# Patient Record
Sex: FEMALE | Race: WHITE | NOT HISPANIC OR LATINO | Employment: OTHER | URBAN - METROPOLITAN AREA
[De-identification: names, ages, dates, MRNs, and addresses within clinical notes are randomized per-mention and may not be internally consistent; named-entity substitution may affect disease eponyms.]

---

## 2020-09-26 ENCOUNTER — HOSPITAL ENCOUNTER (OUTPATIENT)
Dept: RADIOLOGY | Facility: MEDICAL CENTER | Age: 65
End: 2020-09-26
Payer: MEDICARE

## 2020-09-26 ENCOUNTER — APPOINTMENT (OUTPATIENT)
Dept: RADIOLOGY | Facility: MEDICAL CENTER | Age: 65
DRG: 083 | End: 2020-09-26
Payer: MEDICARE

## 2020-09-26 ENCOUNTER — APPOINTMENT (OUTPATIENT)
Dept: RADIOLOGY | Facility: MEDICAL CENTER | Age: 65
DRG: 083 | End: 2020-09-26
Attending: NEUROLOGICAL SURGERY
Payer: MEDICARE

## 2020-09-26 ENCOUNTER — HOSPITAL ENCOUNTER (INPATIENT)
Facility: MEDICAL CENTER | Age: 65
LOS: 3 days | DRG: 083 | End: 2020-09-29
Attending: EMERGENCY MEDICINE | Admitting: SURGERY
Payer: MEDICARE

## 2020-09-26 DIAGNOSIS — I60.9 SUBARACHNOID HEMORRHAGE (HCC): ICD-10-CM

## 2020-09-26 DIAGNOSIS — S06.5XAA SUBDURAL HEMATOMA (HCC): ICD-10-CM

## 2020-09-26 DIAGNOSIS — S02.19XA: ICD-10-CM

## 2020-09-26 PROBLEM — Z53.09 CONTRAINDICATION TO DEEP VEIN THROMBOSIS (DVT) PROPHYLAXIS: Status: ACTIVE | Noted: 2020-09-26

## 2020-09-26 PROBLEM — S02.91XA CLOSED FRACTURE OF SKULL (HCC): Status: ACTIVE | Noted: 2020-09-26

## 2020-09-26 PROBLEM — S01.01XA SCALP LACERATION, INITIAL ENCOUNTER: Status: ACTIVE | Noted: 2020-09-26

## 2020-09-26 PROBLEM — I10 HYPERTENSION: Status: ACTIVE | Noted: 2020-09-26

## 2020-09-26 PROBLEM — Z79.82 ASPIRIN LONG-TERM USE: Status: ACTIVE | Noted: 2020-09-26

## 2020-09-26 PROBLEM — N28.9 RENAL INSUFFICIENCY: Status: ACTIVE | Noted: 2020-09-26

## 2020-09-26 PROBLEM — T14.90XA TRAUMA: Status: ACTIVE | Noted: 2020-09-26

## 2020-09-26 PROBLEM — Z11.9 SCREENING EXAMINATION FOR INFECTIOUS DISEASE: Status: ACTIVE | Noted: 2020-09-26

## 2020-09-26 LAB
ABO GROUP BLD: NORMAL
ALBUMIN SERPL BCP-MCNC: 4.1 G/DL (ref 3.2–4.9)
ALBUMIN/GLOB SERPL: 1.6 G/DL
ALP SERPL-CCNC: 116 U/L (ref 30–99)
ALT SERPL-CCNC: 22 U/L (ref 2–50)
ANION GAP SERPL CALC-SCNC: 14 MMOL/L (ref 7–16)
APTT PPP: 65.2 SEC (ref 24.7–36)
AST SERPL-CCNC: 25 U/L (ref 12–45)
BILIRUB SERPL-MCNC: 0.3 MG/DL (ref 0.1–1.5)
BLD GP AB SCN SERPL QL: NORMAL
BUN SERPL-MCNC: 25 MG/DL (ref 8–22)
CALCIUM SERPL-MCNC: 8.9 MG/DL (ref 8.5–10.5)
CFT BLD TEG: 8.2 MIN (ref 5–10)
CHLORIDE SERPL-SCNC: 103 MMOL/L (ref 96–112)
CLOT ANGLE BLD TEG: 59.4 DEGREES (ref 53–72)
CLOT LYSIS 30M P MA LENFR BLD TEG: 0 % (ref 0–8)
CO2 SERPL-SCNC: 21 MMOL/L (ref 20–33)
COVID ORDER STATUS COVID19: NORMAL
CREAT SERPL-MCNC: 1.19 MG/DL (ref 0.5–1.4)
CT.EXTRINSIC BLD ROTEM: 2.2 MIN (ref 1–3)
ERYTHROCYTE [DISTWIDTH] IN BLOOD BY AUTOMATED COUNT: 45.2 FL (ref 35.9–50)
ETHANOL BLD-MCNC: <10.1 MG/DL (ref 0–10.1)
GLOBULIN SER CALC-MCNC: 2.6 G/DL (ref 1.9–3.5)
GLUCOSE SERPL-MCNC: 142 MG/DL (ref 65–99)
HCG SERPL QL: NEGATIVE
HCT VFR BLD AUTO: 41.4 % (ref 37–47)
HGB BLD-MCNC: 13.8 G/DL (ref 12–16)
INR PPP: 1.11 (ref 0.87–1.13)
MCF BLD TEG: 70.2 MM (ref 50–70)
MCH RBC QN AUTO: 31.7 PG (ref 27–33)
MCHC RBC AUTO-ENTMCNC: 33.3 G/DL (ref 33.6–35)
MCV RBC AUTO: 95.2 FL (ref 81.4–97.8)
PA AA BLD-ACNC: 87.3 %
PA ADP BLD-ACNC: 32.9 %
PLATELET # BLD AUTO: 280 K/UL (ref 164–446)
PMV BLD AUTO: 9.1 FL (ref 9–12.9)
POTASSIUM SERPL-SCNC: 3.9 MMOL/L (ref 3.6–5.5)
PROT SERPL-MCNC: 6.7 G/DL (ref 6–8.2)
PROTHROMBIN TIME: 14.6 SEC (ref 12–14.6)
RBC # BLD AUTO: 4.35 M/UL (ref 4.2–5.4)
RH BLD: NORMAL
SARS-COV+SARS-COV-2 AG RESP QL IA.RAPID: NOTDETECTED
SODIUM SERPL-SCNC: 138 MMOL/L (ref 135–145)
SPECIMEN SOURCE: NORMAL
TEG ALGORITHM TGALG: ABNORMAL
WBC # BLD AUTO: 14.5 K/UL (ref 4.8–10.8)

## 2020-09-26 PROCEDURE — 84703 CHORIONIC GONADOTROPIN ASSAY: CPT

## 2020-09-26 PROCEDURE — 770022 HCHG ROOM/CARE - ICU (200)

## 2020-09-26 PROCEDURE — 99285 EMERGENCY DEPT VISIT HI MDM: CPT

## 2020-09-26 PROCEDURE — 86900 BLOOD TYPING SEROLOGIC ABO: CPT

## 2020-09-26 PROCEDURE — 85576 BLOOD PLATELET AGGREGATION: CPT

## 2020-09-26 PROCEDURE — 85610 PROTHROMBIN TIME: CPT | Mod: 91

## 2020-09-26 PROCEDURE — 85027 COMPLETE CBC AUTOMATED: CPT

## 2020-09-26 PROCEDURE — 86850 RBC ANTIBODY SCREEN: CPT

## 2020-09-26 PROCEDURE — 86901 BLOOD TYPING SEROLOGIC RH(D): CPT

## 2020-09-26 PROCEDURE — 85730 THROMBOPLASTIN TIME PARTIAL: CPT | Mod: 91

## 2020-09-26 PROCEDURE — 700111 HCHG RX REV CODE 636 W/ 250 OVERRIDE (IP): Performed by: SURGERY

## 2020-09-26 PROCEDURE — 85347 COAGULATION TIME ACTIVATED: CPT

## 2020-09-26 PROCEDURE — 87426 SARSCOV CORONAVIRUS AG IA: CPT

## 2020-09-26 PROCEDURE — 305950 HCHG YELLOW TRAUMA ACT PRE-NOTIFY NO CC

## 2020-09-26 PROCEDURE — 80053 COMPREHEN METABOLIC PANEL: CPT | Mod: 91

## 2020-09-26 PROCEDURE — C9803 HOPD COVID-19 SPEC COLLECT: HCPCS | Performed by: SURGERY

## 2020-09-26 PROCEDURE — 700105 HCHG RX REV CODE 258: Performed by: SURGERY

## 2020-09-26 PROCEDURE — 85384 FIBRINOGEN ACTIVITY: CPT

## 2020-09-26 RX ORDER — ENEMA 19; 7 G/133ML; G/133ML
1 ENEMA RECTAL
Status: DISCONTINUED | OUTPATIENT
Start: 2020-09-26 | End: 2020-09-29 | Stop reason: HOSPADM

## 2020-09-26 RX ORDER — LEVETIRACETAM 5 MG/ML
500 INJECTION INTRAVASCULAR 2 TIMES DAILY
Status: DISCONTINUED | OUTPATIENT
Start: 2020-09-26 | End: 2020-09-27

## 2020-09-26 RX ORDER — POLYETHYLENE GLYCOL 3350 17 G/17G
1 POWDER, FOR SOLUTION ORAL 2 TIMES DAILY
Status: DISCONTINUED | OUTPATIENT
Start: 2020-09-26 | End: 2020-09-29 | Stop reason: HOSPADM

## 2020-09-26 RX ORDER — SODIUM CHLORIDE 9 MG/ML
INJECTION, SOLUTION INTRAVENOUS CONTINUOUS
Status: DISCONTINUED | OUTPATIENT
Start: 2020-09-26 | End: 2020-09-27

## 2020-09-26 RX ORDER — BUPROPION HYDROCHLORIDE 100 MG/1
200 TABLET ORAL 2 TIMES DAILY
COMMUNITY

## 2020-09-26 RX ORDER — MORPHINE SULFATE 4 MG/ML
4 INJECTION, SOLUTION INTRAMUSCULAR; INTRAVENOUS
Status: DISCONTINUED | OUTPATIENT
Start: 2020-09-26 | End: 2020-09-27

## 2020-09-26 RX ORDER — FAMOTIDINE 20 MG/1
20 TABLET, FILM COATED ORAL 2 TIMES DAILY
Status: DISCONTINUED | OUTPATIENT
Start: 2020-09-26 | End: 2020-09-27

## 2020-09-26 RX ORDER — AMOXICILLIN 250 MG
1 CAPSULE ORAL
Status: DISCONTINUED | OUTPATIENT
Start: 2020-09-26 | End: 2020-09-29 | Stop reason: HOSPADM

## 2020-09-26 RX ORDER — DOCUSATE SODIUM 100 MG/1
100 CAPSULE, LIQUID FILLED ORAL 2 TIMES DAILY
Status: DISCONTINUED | OUTPATIENT
Start: 2020-09-26 | End: 2020-09-29 | Stop reason: HOSPADM

## 2020-09-26 RX ORDER — BISACODYL 10 MG
10 SUPPOSITORY, RECTAL RECTAL
Status: DISCONTINUED | OUTPATIENT
Start: 2020-09-26 | End: 2020-09-29 | Stop reason: HOSPADM

## 2020-09-26 RX ORDER — AMOXICILLIN 250 MG
1 CAPSULE ORAL NIGHTLY
Status: DISCONTINUED | OUTPATIENT
Start: 2020-09-26 | End: 2020-09-29 | Stop reason: HOSPADM

## 2020-09-26 RX ORDER — ONDANSETRON 2 MG/ML
4 INJECTION INTRAMUSCULAR; INTRAVENOUS EVERY 4 HOURS PRN
Status: DISCONTINUED | OUTPATIENT
Start: 2020-09-26 | End: 2020-09-28

## 2020-09-26 RX ADMIN — MORPHINE SULFATE 4 MG: 4 INJECTION INTRAVENOUS at 23:17

## 2020-09-26 RX ADMIN — FAMOTIDINE 20 MG: 10 INJECTION INTRAVENOUS at 23:17

## 2020-09-26 RX ADMIN — SODIUM CHLORIDE: 9 INJECTION, SOLUTION INTRAVENOUS at 22:48

## 2020-09-26 RX ADMIN — LEVETIRACETAM INJECTION 500 MG: 5 INJECTION INTRAVENOUS at 23:03

## 2020-09-26 ASSESSMENT — COGNITIVE AND FUNCTIONAL STATUS - GENERAL
SUGGESTED CMS G CODE MODIFIER MOBILITY: CH
SUGGESTED CMS G CODE MODIFIER DAILY ACTIVITY: CH
DAILY ACTIVITIY SCORE: 24
MOBILITY SCORE: 24

## 2020-09-26 ASSESSMENT — FIBROSIS 4 INDEX
FIB4 SCORE: 1.53
FIB4 SCORE: 1.24
FIB4 SCORE: 1.3

## 2020-09-26 ASSESSMENT — PATIENT HEALTH QUESTIONNAIRE - PHQ9
1. LITTLE INTEREST OR PLEASURE IN DOING THINGS: NOT AT ALL
2. FEELING DOWN, DEPRESSED, IRRITABLE, OR HOPELESS: NOT AT ALL
SUM OF ALL RESPONSES TO PHQ9 QUESTIONS 1 AND 2: 0

## 2020-09-26 ASSESSMENT — LIFESTYLE VARIABLES
TOTAL SCORE: 0
CONSUMPTION TOTAL: NEGATIVE
HAVE YOU EVER FELT YOU SHOULD CUT DOWN ON YOUR DRINKING: NO
TOTAL SCORE: 0
TOTAL SCORE: 0
ALCOHOL_USE: NO
HOW MANY TIMES IN THE PAST YEAR HAVE YOU HAD 5 OR MORE DRINKS IN A DAY: 0
AVERAGE NUMBER OF DAYS PER WEEK YOU HAVE A DRINK CONTAINING ALCOHOL: 0
EVER FELT BAD OR GUILTY ABOUT YOUR DRINKING: NO
ON A TYPICAL DAY WHEN YOU DRINK ALCOHOL HOW MANY DRINKS DO YOU HAVE: 0
HAVE PEOPLE ANNOYED YOU BY CRITICIZING YOUR DRINKING: NO
EVER HAD A DRINK FIRST THING IN THE MORNING TO STEADY YOUR NERVES TO GET RID OF A HANGOVER: NO

## 2020-09-26 ASSESSMENT — PAIN DESCRIPTION - PAIN TYPE: TYPE: ACUTE PAIN

## 2020-09-27 ENCOUNTER — APPOINTMENT (OUTPATIENT)
Dept: RADIOLOGY | Facility: MEDICAL CENTER | Age: 65
DRG: 083 | End: 2020-09-27
Attending: SURGERY
Payer: MEDICARE

## 2020-09-27 ENCOUNTER — APPOINTMENT (OUTPATIENT)
Dept: RADIOLOGY | Facility: MEDICAL CENTER | Age: 65
DRG: 083 | End: 2020-09-27
Attending: NURSE PRACTITIONER
Payer: MEDICARE

## 2020-09-27 LAB
ABO + RH BLD: NORMAL
ALBUMIN SERPL BCP-MCNC: 3.7 G/DL (ref 3.2–4.9)
ALBUMIN/GLOB SERPL: 1.6 G/DL
ALP SERPL-CCNC: 100 U/L (ref 30–99)
ALT SERPL-CCNC: 19 U/L (ref 2–50)
ANION GAP SERPL CALC-SCNC: 12 MMOL/L (ref 7–16)
AST SERPL-CCNC: 17 U/L (ref 12–45)
BARCODED ABORH UBTYP: 6200
BARCODED PRD CODE UBPRD: NORMAL
BARCODED UNIT NUM UBUNT: NORMAL
BASOPHILS # BLD AUTO: 0.4 % (ref 0–1.8)
BASOPHILS # BLD: 0.05 K/UL (ref 0–0.12)
BILIRUB SERPL-MCNC: 0.4 MG/DL (ref 0.1–1.5)
BUN SERPL-MCNC: 20 MG/DL (ref 8–22)
CALCIUM SERPL-MCNC: 8.4 MG/DL (ref 8.5–10.5)
CFT BLD TEG: 10.2 MIN (ref 5–10)
CHLORIDE SERPL-SCNC: 106 MMOL/L (ref 96–112)
CLOT ANGLE BLD TEG: 61.8 DEGREES (ref 53–72)
CLOT LYSIS 30M P MA LENFR BLD TEG: 0 % (ref 0–8)
CO2 SERPL-SCNC: 21 MMOL/L (ref 20–33)
COMPONENT P 8504P: NORMAL
CREAT SERPL-MCNC: 0.84 MG/DL (ref 0.5–1.4)
CT.EXTRINSIC BLD ROTEM: 1.8 MIN (ref 1–3)
EOSINOPHIL # BLD AUTO: 0.02 K/UL (ref 0–0.51)
EOSINOPHIL NFR BLD: 0.2 % (ref 0–6.9)
ERYTHROCYTE [DISTWIDTH] IN BLOOD BY AUTOMATED COUNT: 45.6 FL (ref 35.9–50)
GLOBULIN SER CALC-MCNC: 2.3 G/DL (ref 1.9–3.5)
GLUCOSE SERPL-MCNC: 138 MG/DL (ref 65–99)
HCT VFR BLD AUTO: 37.9 % (ref 37–47)
HGB BLD-MCNC: 12.5 G/DL (ref 12–16)
IMM GRANULOCYTES # BLD AUTO: 0.04 K/UL (ref 0–0.11)
IMM GRANULOCYTES NFR BLD AUTO: 0.4 % (ref 0–0.9)
LYMPHOCYTES # BLD AUTO: 0.92 K/UL (ref 1–4.8)
LYMPHOCYTES NFR BLD: 8.1 % (ref 22–41)
MCF BLD TEG: 69.6 MM (ref 50–70)
MCH RBC QN AUTO: 31.6 PG (ref 27–33)
MCHC RBC AUTO-ENTMCNC: 33 G/DL (ref 33.6–35)
MCV RBC AUTO: 95.7 FL (ref 81.4–97.8)
MONOCYTES # BLD AUTO: 0.62 K/UL (ref 0–0.85)
MONOCYTES NFR BLD AUTO: 5.5 % (ref 0–13.4)
NEUTROPHILS # BLD AUTO: 9.68 K/UL (ref 2–7.15)
NEUTROPHILS NFR BLD: 85.4 % (ref 44–72)
NRBC # BLD AUTO: 0 K/UL
NRBC BLD-RTO: 0 /100 WBC
PA AA BLD-ACNC: 0 %
PA ADP BLD-ACNC: 1.7 %
PLATELET # BLD AUTO: 254 K/UL (ref 164–446)
PMV BLD AUTO: 9.2 FL (ref 9–12.9)
POTASSIUM SERPL-SCNC: 3.9 MMOL/L (ref 3.6–5.5)
PRODUCT TYPE UPROD: NORMAL
PROT SERPL-MCNC: 6 G/DL (ref 6–8.2)
RBC # BLD AUTO: 3.96 M/UL (ref 4.2–5.4)
SODIUM SERPL-SCNC: 139 MMOL/L (ref 135–145)
TEG ALGORITHM TGALG: ABNORMAL
UNIT STATUS USTAT: NORMAL
WBC # BLD AUTO: 11.3 K/UL (ref 4.8–10.8)

## 2020-09-27 PROCEDURE — 36430 TRANSFUSION BLD/BLD COMPNT: CPT

## 2020-09-27 PROCEDURE — 770022 HCHG ROOM/CARE - ICU (200)

## 2020-09-27 PROCEDURE — 6A550Z2 PHERESIS OF PLATELETS, SINGLE: ICD-10-PCS | Performed by: SURGERY

## 2020-09-27 PROCEDURE — 80053 COMPREHEN METABOLIC PANEL: CPT

## 2020-09-27 PROCEDURE — 93970 EXTREMITY STUDY: CPT | Mod: 26 | Performed by: INTERNAL MEDICINE

## 2020-09-27 PROCEDURE — A9270 NON-COVERED ITEM OR SERVICE: HCPCS | Performed by: SURGERY

## 2020-09-27 PROCEDURE — P9034 PLATELETS, PHERESIS: HCPCS

## 2020-09-27 PROCEDURE — 93970 EXTREMITY STUDY: CPT

## 2020-09-27 PROCEDURE — 700102 HCHG RX REV CODE 250 W/ 637 OVERRIDE(OP): Performed by: NURSE PRACTITIONER

## 2020-09-27 PROCEDURE — 70450 CT HEAD/BRAIN W/O DYE: CPT

## 2020-09-27 PROCEDURE — 85025 COMPLETE CBC W/AUTO DIFF WBC: CPT

## 2020-09-27 PROCEDURE — 85384 FIBRINOGEN ACTIVITY: CPT

## 2020-09-27 PROCEDURE — 700111 HCHG RX REV CODE 636 W/ 250 OVERRIDE (IP): Performed by: SURGERY

## 2020-09-27 PROCEDURE — 700102 HCHG RX REV CODE 250 W/ 637 OVERRIDE(OP): Performed by: SURGERY

## 2020-09-27 PROCEDURE — A9270 NON-COVERED ITEM OR SERVICE: HCPCS | Performed by: NURSE PRACTITIONER

## 2020-09-27 PROCEDURE — 85576 BLOOD PLATELET AGGREGATION: CPT | Mod: 91

## 2020-09-27 PROCEDURE — 85347 COAGULATION TIME ACTIVATED: CPT

## 2020-09-27 PROCEDURE — 99233 SBSQ HOSP IP/OBS HIGH 50: CPT | Performed by: SURGERY

## 2020-09-27 RX ORDER — LEVETIRACETAM 500 MG/1
500 TABLET ORAL 2 TIMES DAILY
Status: DISCONTINUED | OUTPATIENT
Start: 2020-09-27 | End: 2020-09-29 | Stop reason: HOSPADM

## 2020-09-27 RX ORDER — LEVETIRACETAM 5 MG/ML
500 INJECTION INTRAVASCULAR 2 TIMES DAILY
Status: DISCONTINUED | OUTPATIENT
Start: 2020-09-27 | End: 2020-09-27

## 2020-09-27 RX ORDER — ACETAMINOPHEN 325 MG/1
650 TABLET ORAL EVERY 4 HOURS
Status: DISCONTINUED | OUTPATIENT
Start: 2020-09-27 | End: 2020-09-29 | Stop reason: HOSPADM

## 2020-09-27 RX ORDER — OXYCODONE HYDROCHLORIDE 5 MG/1
5 TABLET ORAL EVERY 4 HOURS PRN
Status: DISCONTINUED | OUTPATIENT
Start: 2020-09-27 | End: 2020-09-27

## 2020-09-27 RX ORDER — MORPHINE SULFATE 4 MG/ML
2 INJECTION, SOLUTION INTRAMUSCULAR; INTRAVENOUS
Status: DISCONTINUED | OUTPATIENT
Start: 2020-09-27 | End: 2020-09-28

## 2020-09-27 RX ORDER — OXYCODONE HYDROCHLORIDE 5 MG/1
5-10 TABLET ORAL EVERY 4 HOURS PRN
Status: DISCONTINUED | OUTPATIENT
Start: 2020-09-27 | End: 2020-09-29 | Stop reason: HOSPADM

## 2020-09-27 RX ADMIN — MORPHINE SULFATE 4 MG: 4 INJECTION INTRAVENOUS at 09:42

## 2020-09-27 RX ADMIN — ACETAMINOPHEN 650 MG: 325 TABLET, FILM COATED ORAL at 15:08

## 2020-09-27 RX ADMIN — ACETAMINOPHEN 650 MG: 325 TABLET, FILM COATED ORAL at 18:15

## 2020-09-27 RX ADMIN — LEVETIRACETAM 500 MG: 500 TABLET ORAL at 18:15

## 2020-09-27 RX ADMIN — DOCUSATE SODIUM 100 MG: 100 CAPSULE, LIQUID FILLED ORAL at 18:15

## 2020-09-27 RX ADMIN — LEVETIRACETAM INJECTION 500 MG: 5 INJECTION INTRAVENOUS at 08:19

## 2020-09-27 RX ADMIN — MORPHINE SULFATE 4 MG: 4 INJECTION INTRAVENOUS at 03:00

## 2020-09-27 ASSESSMENT — COPD QUESTIONNAIRES
DURING THE PAST 4 WEEKS HOW MUCH DID YOU FEEL SHORT OF BREATH: NONE/LITTLE OF THE TIME
COPD SCREENING SCORE: 2
DO YOU EVER COUGH UP ANY MUCUS OR PHLEGM?: YES, A FEW DAYS A WEEK OR MONTH
HAVE YOU SMOKED AT LEAST 100 CIGARETTES IN YOUR ENTIRE LIFE: NO/DON'T KNOW

## 2020-09-27 ASSESSMENT — LIFESTYLE VARIABLES: EVER_SMOKED: NEVER

## 2020-09-27 ASSESSMENT — ENCOUNTER SYMPTOMS
RESPIRATORY NEGATIVE: 1
PSYCHIATRIC NEGATIVE: 1
MYALGIAS: 1
CARDIOVASCULAR NEGATIVE: 1
HEADACHES: 1

## 2020-09-27 ASSESSMENT — PAIN DESCRIPTION - PAIN TYPE
TYPE: ACUTE PAIN
TYPE: ACUTE PAIN

## 2020-09-27 NOTE — ASSESSMENT & PLAN NOTE
Fell backwards on stairs. (-) LOC.  Evaluated at [facility]   Trauma Yellow Transfer Activation.  Guera Smith MD. Trauma Surgery.

## 2020-09-27 NOTE — ASSESSMENT & PLAN NOTE
Initial systemic anticoagulation contraindicated secondary to elevated bleeding risk.   9/27 Surveillance venous duplex negative.  9/28 Heparin initiated.

## 2020-09-27 NOTE — ED PROVIDER NOTES
ED Provider Note    This patient was cared for during the COVID-19 pandemic. History and physical exam may be limited/truncated by the inherent challenges of PPE and the need to decrease staff exposure to novel coronavirus. Some aspects of disease management may be different to protect staff and help slow the spread of disease. I verified that, if possible, the patient was wearing a mask and I was wearing appropriate PPE every time I encountered the patient.       CHIEF COMPLAINT  No chief complaint on file.      BRYAN Ford is a 65 y.o. female who presents as a transfer from [facility].  Patient tripped down 3 stairs and hit her head against a cement block.  She does not remember the fall she has had a mild headache and was nauseous.  She takes aspirin but no other anticoagulation.  She does not have a history of loss of consciousness.  Patient was visiting from [place].        REVIEW OF SYSTEMS  positive for headache nausea, negative for fevers vomiting. All other systems are negative.     PAST MEDICAL HISTORY   has a past medical history of Hypertension, Psychiatric disorder, and Renal disorder.    SOCIAL HISTORY  Social History     Tobacco Use   • Smoking status: Never Smoker   • Smokeless tobacco: Never Used   Substance and Sexual Activity   • Alcohol use: Never     Frequency: Never   • Drug use: Never   • Sexual activity: Not on file       SURGICAL HISTORY   has a past surgical history that includes ankle fusion (Left) and hysterectomy, total abdominal.    CURRENT MEDICATIONS  Home Medications     Reviewed by Eleanor Banks R.N. (Registered Nurse) on 09/26/20 at 9573  Med List Status: Partial   Medication Last Dose Status   AMLODIPINE BESYLATE PO  Active   aspirin (ASA) 81 MG Chew Tab chewable tablet  Active   atorvastatin (LIPITOR) 10 MG Tab  Active   buPROPion (WELLBUTRIN) 100 MG Tab  Active   DULOXETINE HCL PO  Active   furosemide (LASIX) 20 MG Tab  Active   METOPROLOL SUCCINATE PO  Active  "  SPIRONOLACTONE PO  Active                ALLERGIES  Allergies   Allergen Reactions   • Lisinopril Swelling     Lips swelling       PHYSICAL EXAM  VITAL SIGNS: BP (!) 161/92   Pulse 97   Temp 36.6 °C (97.9 °F) (Temporal)   Resp (!) 21   Ht 1.651 m (5' 5\")   Wt 111 kg (244 lb 11.4 oz)   SpO2 100%   BMI 40.72 kg/m² .  Constitutional: Alert in no apparent distress.  HENT: No signs of trauma, Bilateral external ears normal, Nose normal.   Eyes: Pupils are equal and reactive, Conjunctiva normal, Non-icteric.   Neck: Normal range of motion, No tenderness, Supple, No stridor.   Cardiovascular: Regular rate and rhythm, no murmurs.   Thorax & Lungs: Normal breath sounds, No respiratory distress, No wheezing, No chest tenderness.   Abdomen: Bowel sounds normal, Soft, No tenderness, No masses, No peritoneal signs.  Skin: Warm, Dry, No erythema, No rash.   Musculoskeletal:  no major deformities noted.   Neurologic: Alert,  No focal deficits noted.   Psychiatric: Affect normal, Judgment normal, Mood normal.       DIAGNOSTIC STUDIES / PROCEDURES          LABS  Labs Reviewed   CBC WITHOUT DIFFERENTIAL - Abnormal; Notable for the following components:       Result Value    WBC 14.5 (*)     MCHC 33.3 (*)     All other components within normal limits   COMP METABOLIC PANEL - Abnormal; Notable for the following components:    Glucose 142 (*)     Bun 25 (*)     Alkaline Phosphatase 116 (*)     All other components within normal limits   ESTIMATED GFR - Abnormal; Notable for the following components:    GFR If  55 (*)     GFR If Non  45 (*)     All other components within normal limits   COVID/SARS COV-2    Narrative:     Is patient being admitted?->Yes  Does this patient meet criteria for Rush/Cepheid per [facility]  Inpatient Workflow? (See workflow link below)->Yes  Expected turn around time?->Rush (Cepheid 2-4 hours)  Is this the patients First SARS CoV-2 test?->Unknown  Is this patient employed " in healthcare?->Unknown  Is the patient symptomatic as defined by the CDC?->Unknown  Is the patient hospitalized?->Yes  Is the patient in the ICU?->Yes  Is the patient a resident in a congregate care  setting?->Unknown  Is the patient pregnant?->Unknown   DIAGNOSTIC ALCOHOL   PROTHROMBIN TIME   APTT   HCG QUAL SERUM   COD (ADULT)   PLATELET MAPPING WITH BASIC TEG   SARS-COV ANTIGEN OSCAR    Narrative:     Is patient being admitted?->Yes  Does this patient meet criteria for Rush/Cepheid per [facility]  Inpatient Workflow? (See workflow link below)->Yes  Expected turn around time?->Rush (Cepheid 2-4 hours)  Is this the patients First SARS CoV-2 test?->Unknown  Is this patient employed in healthcare?->Unknown  Is the patient symptomatic as defined by the CDC?->Unknown  Is the patient hospitalized?->Yes  Is the patient in the ICU?->Yes  Is the patient a resident in a congregate care  setting?->Unknown  Is the patient pregnant?->Unknown   COMPONENT CELLULAR   ABO RH CONFIRM       RADIOLOGY  OUTSIDE IMAGES-CT CERVICAL SPINE   Final Result      OUTSIDE IMAGES-CT HEAD   Final Result      CT-HEAD W/O    (Results Pending)   CT-HEAD W/O    (Results Pending)   US-ABORTED US PROCEDURE    (Results Pending)       Medical records reviewed for continuity of care.  Records from [place] are reviewed.  CT scan shows a moderate subdural over the left occipital and parietal lobes.  There is additionally subarachnoid blood.  There is a nondisplaced fracture involving the right posterior fossa of the skull.        COURSE & MEDICAL DECISION MAKING  Pertinent Labs & Imaging studies reviewed. (See chart for details)  Patient was evaluated in the trauma bay by myself and Dr. Smith, trauma surgery.    Patient has images uploaded in the system that show moderate subdural and subarachnoid.  Dr. Smith spoke with Dr. Garcia who will consult.  Patient will be hospitalized with the trauma service in the ICU in critical condition.          FINAL  IMPRESSION  1. Subarachnoid hemorrhage (HCC)     2. Closed fracture of posterior cranial fossa, initial encounter (HCC)     3. Subdural hematoma (HCC)         2.   3.    This dictation has been creating using voice recognition software. The accuracy of the dictation is limited the abilities of the software.  I expect there may be some errors of grammar and possibly content. I made every attempt to manually correct the errors within my dictation. However errors related to this voice recognition software may still exist and should be interpreted within the appropriate context.      The note accurately reflects work and decisions made by me.  Irma Wallace M.D.  9/26/2020  11:01 PM

## 2020-09-27 NOTE — ASSESSMENT & PLAN NOTE
Nondisplaced fracture involving the right posterior fossa of the skull.  Non-operative management.  Ronald Garcia DO. Neurosurgeon. [facility].

## 2020-09-27 NOTE — ED NOTES
Fell backwards hit head on wall, takes aspirin at home, no complaints of pain,  5mg compazine, 50 mcgfentanyl and 4mg zofran given in route

## 2020-09-27 NOTE — ASSESSMENT & PLAN NOTE
Moderate subdural hematoma posterior overlying the left occipital and parietal lobes.  Subarachnoid blood within the brain sulcations at the anterior margins, and base of the frontal lobes.  Non-operative management.   Follow up head CT with new hemorrhage in left frontal region  Post traumatic pharmacologic seizure prophylaxis for 1 week.  Speech Language Pathology cognitive evaluation.  Ronald Garcia DO. Neurosurgeon. [facility].

## 2020-09-27 NOTE — ASSESSMENT & PLAN NOTE
Abnormal renal indices on admission  Cr 1.4 / BUN 24 / GFR 38  Gentle hydration  Repeat labs improved

## 2020-09-27 NOTE — CARE PLAN
Problem: Knowledge Deficit  Goal: Knowledge of disease process/condition, treatment plan, diagnostic tests, and medications will improve  Outcome: PROGRESSING AS EXPECTED  Intervention: Assess knowledge level of disease process/condition, treatment plan, diagnostic tests, and medications  Note: Understands plan of care and treatment      Problem: Pain Management  Goal: Pain level will decrease to patient's comfort goal  Outcome: PROGRESSING AS EXPECTED  Intervention: Follow pain managment plan developed in collaboration with patient and Interdisciplinary Team  Note: PRN morphine given for headache

## 2020-09-27 NOTE — DISCHARGE PLANNING
Trauma Response    Referral: Trauma Red Response    Intervention: SW responded to trauma red.  Pt was BIB [facility] after a fall .  Pt was alert upon arrival.  Pts name is Ling Ford (: 1955).  SW obtained the following pt information: SW spoke to the pt in the trauma bay and she advised SW that her sister is aware of her being brought to [facility].  The pt advised DONY that her emergency contact is Julieta (sister) [phone]    Plan: SW will remain available for pt support.

## 2020-09-27 NOTE — PROGRESS NOTES
Full note to follow.  CT head reviewed. Left small acute SDH and skull fracture. ICU admission for frequent neuro checks.   Does take 81 mg Aspirin.   Agree with repeat in am.  Keppra sz ppx  Elevate head of bed greater than 30 degrees.  Q1 neuro checks.  TEG pending. If inhibited then recommend giving platelets.   No operative interventions at this time.

## 2020-09-27 NOTE — PROGRESS NOTES
"Trauma Progress Note 9/27/2020 5:08 AM    This is a 65 y.o. female who had a ground level fall. She sustained subdural hematoma and subarachnoid hemorrhage. He neuro checks were stable through the night with GCS of 15. TEG with platelet mapping showed 87.2 % AA inhibition and the patient was transfused a unit of platelets.   Approximate resuscitation given to this point includes: 1 U platelets    Plan:   - continue neuro checks   - remain in ICU today  - may have a diet per neurosurgery  - repeat TEG at 9 AM    Assessment: awake, alert, GCS 15    /67   Pulse 93   Temp 36.6 °C (97.9 °F) (Temporal)   Resp 20   Ht 1.651 m (5' 5\")   Wt 111 kg (244 lb 11.4 oz)   SpO2 96%   BMI 40.72 kg/m²     Hemoglobin: 12.5 g/dL  Hematocrit: 37.9 %    Urine Output: voiding / adequate output    Recent Labs     09/26/20 2005 09/26/20  2214   APTT 45.3* 65.2*   INR 1.11 1.11      Recent Labs     09/26/20  2214   REACTMIN 8.2   CLOTKINET 2.2   CLOTANGL 59.4   MAXCLOTS 70.2*   [id number] 0.0   PRCINADP 32.9   PRCINAA 87.3     Subdural hematoma, post-traumatic (HCC)- (present on admission)  Assessment & Plan  Moderate subdural hematoma posterior overlying the left occipital and parietal lobes.  Subarachnoid blood within the brain sulcations at the anterior margins, and base of the frontal lobes.  Non-operative management.   Follow up head CT in AM  Post traumatic pharmacologic seizure prophylaxis for 1 week.  Speech Language Pathology cognitive evaluation.  Ronald Garcia DO. Neurosurgeon. [facility].    Hypertension- (present on admission)  Assessment & Plan  Chronic condition treated with amlodipine, metoprolol.  Definitive medication reconciliation pending.    Renal insufficiency- (present on admission)  Assessment & Plan  Abnormal renal indices on admission  Cr 1.4 / BUN 24 / GFR 38  Gentle hydration  Repeat labs in AM    Aspirin long-term use- (present on admission)  Assessment & Plan  Daily aspirin 81 mg  TEG " with 87.2 % AA inhibition   Transfused one unit of platelets.  Repeat TEG     Contraindication to deep vein thrombosis (DVT) prophylaxis- (present on admission)  Assessment & Plan  Initial systemic anticoagulation contraindicated secondary to elevated bleeding risk.   9/27 Surveillance venous duplex scanning ordered    Screening examination for infectious disease- (present on admission)  Assessment & Plan  Admission SARS-CoV-2 testing negative. LOW RISK patient. Repeat SARS-CoV-2 testing not indicated. Isolation precautions de-escalated.    Closed fracture of skull (HCC)- (present on admission)  Assessment & Plan  Nondisplaced fracture involving the right posterior fossa of the skull.  Non-operative management.  Ronald Garcia DO. Neurosurgeon. [facility].    Scalp laceration, initial encounter- (present on admission)  Assessment & Plan  Repaired at [facility]    Trauma- (present on admission)  Assessment & Plan  Fell backwards on stairs. (-) LOC.  Evaluated at [facility]   Trauma Yellow Transfer Activation.  Guera Smith MD. Trauma Surgery.

## 2020-09-27 NOTE — PROGRESS NOTES
"   TRAUMA TERTIARY SURVEY     Mental status adequate for full examination?: Yes    Spine cleared (radiologically and/or clinically): Yes    PHYSICAL EXAMINATION:  Vitals: /68   Pulse 93   Temp 36.3 °C (97.4 °F)   Resp 20   Ht 1.651 m (5' 5\")   Wt 111 kg (244 lb 11.4 oz)   SpO2 95%   BMI 40.72 kg/m²   Constitutional:     General Appearance: appears stated age.  HEENT:    Staples posterior head.. The pupils are equal, round, and reactive to light bilaterally. The extraocular muscles are intact bilaterally.. The nares and oropharynx are clear. The midface and jaw are stable. No malocclusion is evident.  Neck:    Normal range of motion.  Respiratory:   Inspection: Unlabored respirations, no intercostal retractions, paradoxical motion, or accessory muscle use.   Palpation:  The chest is nontender. The clavicles are non deformed bilaterally..   Auscultation: clear to auscultation.  Cardiovascular:   Auscultation: regular rate and rhythm.   Peripheral Pulses: Normal.   Abdomen:   Abdomen is soft, nontender, without organomegaly or masses.  Genitourinary:   (FEMALE): not observed.  Musculoskeletal:   The pelvis is stable.  No significant angulation, deformity, or soft tissue injury involving the upper and lower extremities. Normal range of motion.   Back:   The thoracolumbar spine was examined. Examination is remarkable for no significant tenderness, swelling, or deformity in the thoracolumbar region.  Skin:   The skin is warm and dry.  Neurologic:    [place] Coma Scale (GCS) 15 [id number]. Neurologic examination revealed no focal deficits noted.  Psychiatric:   The patient does not appear depressed or anxious.    IMAGING:  CT-HEAD W/O   Final Result         1. Grossly similar bilateral acute subdural hematoma along the right frontal, left parietal and occipital lobe with extension to the falx.      2. Grossly unchanged bilateral frontal subarachnoid hemorrhage.      3.New 1.0 cm left anterior frontal " intraparenchymal hemorrhage.            OUTSIDE IMAGES-CT CERVICAL SPINE   Final Result      OUTSIDE IMAGES-CT HEAD   Final Result      US-ABORTED US PROCEDURE    (Results Pending)   US-TRAUMA VEIN SCREEN LOWER BILAT EXTREMITY    (Results Pending)     All current laboratory studies/radiology exams reviewed: Yes    Completed Consultations:  Neurosurgery     Pending Consultations:  none    Newly Identified Diagnoses and Injuries:  none    TOTAL RAP SCORE:  RAP Score Total: 8      ETOH Screening  CAGE Score: 0  Assessment complete date: 9/27/2020

## 2020-09-27 NOTE — PROGRESS NOTES
Released unit of platelets, blood bank unable to complete request due to multiple traumas. Told to call back in 30 min

## 2020-09-27 NOTE — DIETARY
NUTRITION SERVICES: BMI - Pt with BMI >40 (=Body mass index is 40.72 kg/m².), morbid obesity. Weight loss counseling not appropriate in acute care setting. RECOMMEND - Referral to outpatient nutrition services for weight management after D/C. RD available PRN.

## 2020-09-27 NOTE — CONSULTS
Neurosurgery Consult Note    Patient: Ling Ford    MRN: [id number]    Date of Consultation: 9/27/2020    Reason for Consultation: S/P fall with SDH    Referring Physician: ED    History of Present Illness:  65 yr old female, going up stairs and fell backwards. Did have LOC. Currently awake and appropriately conversant. No complaints of numbness/tingling, no N/V. No new issues. Slight HA. Does take a baby aspirin for cardiac health. HX of DM, HTN    Review of Systems:  Constitutional: Denies fevers, chills, night sweats, or weight changes  Eyes: Denies changes in vision, or eye pain  Ears/Nose/Throat/Mouth: Denies nasal congestion or sore throat   Cardiovascular: Denies chest pain or palpitations   Respiratory: Denies shortness of breath, or cough  Gastrointestinal/Hepatic: Denies abdominal pain, nausea, vomiting, diarrhea, or constipation  Genitourinary: Denies dysuria, frequency, or incontinence  Musculoskeletal/Rheum: Denies joint pain or swelling   Skin: Denies rash  Neurological: Denies headache, confusion, memory loss, focal weakness, or parasthesias  Psychiatric: Denies mood disorder   Endocrine: Denies hx of diabetes or thyroid dysfunction  Heme/Oncology/Lymph Nodes: Denies enlarged lymph nodes, bruising, or known bleeding disorder  Allergic/Immunologic: Denies hx of autoimmune disorder      Medications:  Current Facility-Administered Medications   Medication Dose Route Frequency Provider Last Rate Last Dose   • Respiratory Therapy Consult   Nebulization Continuous RT Guera Smith M.D.       • Pharmacy Consult Request ...Pain Management Review 1 Each  1 Each Other PHARMACY TO DOSE Guera Smith M.D.       • docusate sodium (COLACE) capsule 100 mg  100 mg Oral BID Guera Smith M.D.   Stopped at 09/26/20 2245   • senna-docusate (PERICOLACE or SENOKOT S) 8.6-50 MG per tablet 1 Tab  1 Tab Oral Nightly Guera Smith M.D.   Stopped at 09/26/20 2245   • senna-docusate (PERICOLACE or SENOKOT S) 8.6-50 MG  per tablet 1 Tab  1 Tab Oral Q24HRS PRN Guera Smith M.D.       • polyethylene glycol/lytes (MIRALAX) PACKET 1 Packet  1 Packet Oral BID Guera Smith M.D.   Stopped at 09/26/20 2245   • magnesium hydroxide (MILK OF MAGNESIA) suspension 30 mL  30 mL Oral DAILY Guera Smith M.D.   Stopped at 09/27/20 0600   • bisacodyl (DULCOLAX) suppository 10 mg  10 mg Rectal Q24HRS PRN Guera Smith M.D.       • fleet enema 133 mL  1 Each Rectal Once PRN Guera Smith M.D.       • NS infusion   Intravenous Continuous Guera Smith M.D. 100 mL/hr at 09/26/20 2248     • morphine (pf) 4 mg/mL injection 4 mg  4 mg Intravenous Q3HRS PRN Guera Smith M.D.   4 mg at 09/27/20 0300   • famotidine (PEPCID) tablet 20 mg  20 mg Oral BID Guera Smith M.D.        Or   • famotidine (PEPCID) injection 20 mg  20 mg Intravenous BID Guera Smith M.D.   Stopped at 09/27/20 0600   • ondansetron (ZOFRAN) syringe/vial injection 4 mg  4 mg Intravenous Q4HRS PRN Guera Smith M.D.       • levETIRAcetam (Keppra) 500 mg in 100 mL NaCl IV premix  500 mg Intravenous BID Guera Smiht M.D.   Stopped at 09/26/20 2318   • influenza Vac High-Dose Quad (Fluzone) injection 0.7 mL  0.7 mL Intramuscular Once PRN Guera Smith M.D.           Allergies:  Allergies   Allergen Reactions   • Lisinopril Swelling     Lips swelling       Past Medical History:  Past Medical History:   Diagnosis Date   • Hypertension    • Psychiatric disorder    • Renal disorder        Past Surgical History:  Past Surgical History:   Procedure Laterality Date   • ANKLE FUSION Left    • HYSTERECTOMY, TOTAL ABDOMINAL         Family History:  No family history on file.    Social History:  Social History     Socioeconomic History   • Marital status:      Spouse name: Not on file   • Number of children: Not on file   • Years of education: Not on file   • Highest education level: Not on file   Occupational History   • Not on file   Social Needs   • Financial  resource strain: Not on file   • Food insecurity     Worry: Not on file     Inability: Not on file   • Transportation needs     Medical: Not on file     Non-medical: Not on file   Tobacco Use   • Smoking status: Never Smoker   • Smokeless tobacco: Never Used   Substance and Sexual Activity   • Alcohol use: Never     Frequency: Never   • Drug use: Never   • Sexual activity: Not on file   Lifestyle   • Physical activity     Days per week: Not on file     Minutes per session: Not on file   • Stress: Not on file   Relationships   • Social connections     Talks on phone: Not on file     Gets together: Not on file     Attends [employer] service: Not on file     Active member of club or organization: Not on file     Attends meetings of clubs or organizations: Not on file     Relationship status: Not on file   • Intimate partner violence     Fear of current or ex partner: Not on file     Emotionally abused: Not on file     Physically abused: Not on file     Forced sexual activity: Not on file   Other Topics Concern   • Not on file   Social History Narrative   • Not on file       Physical Examination:  AOx3. Reflexes and motor strength normal and symmetric. Cranial nerves 2-12 and sensation grossly intact.  speech normal, mental status intact, cranial nerves 2-12 intact, muscle tone normal, muscle strength normal, sensation to light touch and pinprick normal  PERRLA BL    Labs:  Recent Labs     09/26/20 2005 09/26/20 2214 09/27/20  0500   WBC 9.1 14.5* 11.3*   RBC 4.45 4.35 3.96*   HEMOGLOBIN 13.9 13.8 12.5   HEMATOCRIT 41.9 41.4 37.9   MCV 94.2 95.2 95.7   MCH 31.2 31.7 31.6   MCHC 33.2 33.3* 33.0*   RDW 13.0 45.2 45.6   PLATELETCT 329 280 254   MPV 9.6 9.1 9.2     Recent Labs     09/26/20 2005 09/26/20 2214 09/27/20  0500   SODIUM 142 138 139   POTASSIUM 3.9 3.9 3.9   CHLORIDE 108* 103 106   CO2 21* 21 21   GLUCOSE 111* 142* 138*   BUN 25* 25* 20   CREATININE 1.4* 1.19 0.84   CALCIUM 9.4 8.9 8.4*     Recent Labs      09/26/20 2005 09/26/20 2214   APTT 45.3* 65.2*   INR 1.11 1.11     Recent Labs     09/26/20  2214   REACTMIN 8.2   CLOTKINET 2.2   CLOTANGL 59.4   MAXCLOTS 70.2*   [id number] 0.0   PRCINADP 32.9   PRCINAA 87.3       Imaging:  CT head:    1. Grossly similar bilateral acute subdural hematoma along the right frontal, left parietal and occipital lobe with extension to the falx.     2. Grossly unchanged bilateral frontal subarachnoid hemorrhage.     3.New 1.0 cm left anterior frontal intraparenchymal hemorrhage    Assessment and Plan:  Left SDH  tSAH bilateral  Parenchymal contusions    Pain control  Okay for Q2 neuro checks  CT head this am stable.  PT/OT  SCDS  SBP less than 150 mmhg  Elevate head of bed greater than 30 degrees.  Being given platelets for platelet inhibition  Repeat CT tomorrow am.   Advance diet as tolerated.       Ronald Garcia D.O.

## 2020-09-27 NOTE — PROGRESS NOTES
Patient admitted to [facility], A&Ox4, BELTRAN no deficits, complains of headache 5/10. Dr Garcia updated via telephone, head CT in AM and q1h neuro checks.   Unable to complete home med rec, patient does not remember some doses. Will call pharmacy in AM    2 RN skin check completed with Megan RN  - Wound noted to posterior head, stapled at [place].   - Redness under pannus

## 2020-09-27 NOTE — PROGRESS NOTES
2 RN skin assessment with SHIV Rivers    Stapled laceration to occiput. Well approximated, scant drainage.   No other areas of concern.  Pt repositions self in bed frequently. Educated about skin care and preventative measures.

## 2020-09-27 NOTE — ASSESSMENT & PLAN NOTE
Daily aspirin 81 mg  TEG with 87.2 % AA inhibition   Transfused one unit of platelets.  Repeat with 0.0% inhibition.

## 2020-09-27 NOTE — PROGRESS NOTES
Neurosurgery Progress Note    Subjective:  Pt reports that she is doing well.  Pt endorses headache.    Exam:  A&Ox4  NAD  RA, normal respiratory effort  Follows commands x4  PERRLA. EOMI. Pupils 3  Face is symmetrical, tongue is midline  CN II-XII grossly intact bilat  No difficulty with speech  Negative pronator drift test  No difficulty with rapid alternating movements  Appropriate muscle tone and sensation intact all four extremities.       BP  Min: 127/68  Max: 162/96  Pulse  Av.8  Min: 91  Max: 98  Resp  Av.4  Min: 15  Max: 23  Temp  Av.4 °C (97.5 °F)  Min: 36.2 °C (97.1 °F)  Max: 36.6 °C (97.9 °F)  SpO2  Av.6 %  Min: 92 %  Max: 100 %    No data recorded    Recent Labs     20  0500   WBC 9.1 14.5* 11.3*   RBC 4.45 4.35 3.96*   HEMOGLOBIN 13.9 13.8 12.5   HEMATOCRIT 41.9 41.4 37.9   MCV 94.2 95.2 95.7   MCH 31.2 31.7 31.6   MCHC 33.2 33.3* 33.0*   RDW 13.0 45.2 45.6   PLATELETCT 329 280 254   MPV 9.6 9.1 9.2     Recent Labs     20  0500   SODIUM 142 138 139   POTASSIUM 3.9 3.9 3.9   CHLORIDE 108* 103 106   CO2 21* 21 21   GLUCOSE 111* 142* 138*   BUN 25* 25* 20   CREATININE 1.4* 1.19 0.84   CALCIUM 9.4 8.9 8.4*     Recent Labs     20   APTT 45.3* 65.2*   INR 1.11 1.11     Recent Labs     20   REACTMIN 8.2   CLOTKINET 2.2   CLOTANGL 59.4   MAXCLOTS 70.2*   VCU89CIY 0.0   PRCINADP 32.9   PRCINAA 87.3       Intake/Output       20 07 - 20 0659 20 07 - 20 0659       Total  Total       Intake    I.V.  --  520 520  --  -- --    Pre-Hospital Volume -- 0 0 -- -- --    Trauma Resuscitation Volume -- 0 0 -- -- --    Volume (mL) (NS infusion) -- 520 520 -- -- --    Blood  --  0 0  496  -- 496    PRBC Total Volume (Non-Barcoded) -- 0 0 -- -- --    FFP Total Volume (Non-Barcoded) -- 0 0 -- -- --    Platelets Total Volume  (Non-Barcoded) -- 0 0 -- -- --    Cryoprecipitate (Pooled) Total Volume (Non-Barcoded) -- 0 0 -- -- --    Volume (RELEASE PLATELET PHERESIS) -- -- -- 496 -- 496    IV Piggyback  --  100 100  --  -- --    Volume (mL) (levETIRAcetam (Keppra) 500 mg in 100 mL NaCl IV premix) -- 100 100 -- -- --    Total Intake -- 620 620 496 -- 496       Output    Urine  --  300 300  --  -- --    Urine Void (mL) -- 300 300 -- -- --    Other  --  0 0  --  -- --    Pre-Hospital Output -- 0 0 -- -- --    Trauma Resuscitation Output -- 0 0 -- -- --    Blood  --  0 0  --  -- --    Est. Blood Loss -- 0 0 -- -- --    Total Output -- 300 300 -- -- --       Net I/O     -- 320 320 496 -- 496            Intake/Output Summary (Last 24 hours) at 9/27/2020 1041  Last data filed at 9/27/2020 0703  Gross per 24 hour   Intake 1116 ml   Output 300 ml   Net 816 ml            • levETIRAcetam (Keppra) IV  500 mg BID   • oxyCODONE immediate-release  5 mg Q4HRS PRN   • Respiratory Therapy Consult   Continuous RT   • Pharmacy Consult Request  1 Each PHARMACY TO DOSE   • docusate sodium  100 mg BID   • senna-docusate  1 Tab Nightly   • senna-docusate  1 Tab Q24HRS PRN   • polyethylene glycol/lytes  1 Packet BID   • magnesium hydroxide  30 mL DAILY   • bisacodyl  10 mg Q24HRS PRN   • fleet  1 Each Once PRN   • morphine injection  4 mg Q3HRS PRN   • famotidine  20 mg BID    Or   • famotidine  20 mg BID   • ondansetron  4 mg Q4HRS PRN   • influenza Vac High-Dose Quad  0.7 mL Once PRN       Assessment and Plan:  Hospital day #2Left SDH  tSAH bilateral  Parenchymal contusions     Pain control  Okay for Q2 neuro checks during the day, q4hrs at night  CT head this am stable.  PT/OT  SCDS  SBP less than 150 mmhg  Elevate head of bed greater than 30 degrees.  Being given platelets for platelet inhibition  Repeat CT tomorrow am.   Advance diet as tolerated.

## 2020-09-27 NOTE — PROGRESS NOTES
Trauma / Surgical Daily Progress Note    Date of Service  9/27/2020    Chief Complaint  65 y.o. female admitted 9/26/2020 as a trauma yellow transfer - fall - non op ICB    Interval Events  Tertiary complete - no further findings  RAP/SBIRT complete  Neuro note reviewed - continue Q 2 h checks during, Q 4 h at night - continue ICU care  Follow up AA 0.0% post platelet transfusion  Advance diet  Therapies pending    Lives in [place], here visiting. Anticipate home in next 48 hours without needs.    Review of Systems  Review of Systems   Constitutional: Positive for malaise/fatigue.   HENT: Negative.    Respiratory: Negative.    Cardiovascular: Negative.    Genitourinary: Negative.    Musculoskeletal: Positive for myalgias.   Neurological: Positive for headaches.   Psychiatric/Behavioral: Negative.    All other systems reviewed and are negative.       Vital Signs  Temp:  [36.2 °C (97.1 °F)-36.6 °C (97.9 °F)] 36.3 °C (97.4 °F)  Pulse:  [91-98] 93  Resp:  [15-23] 20  BP: (127-162)/(65-96) 141/68  SpO2:  [92 %-100 %] 95 %    Physical Exam  Physical Exam  Vitals signs and nursing note reviewed.   Constitutional:       General: She is not in acute distress.     Appearance: Normal appearance. She is obese.      Interventions: Nasal cannula in place.   HENT:      Head: Laceration (stapled posterior) present.      Right Ear: External ear normal.      Left Ear: External ear normal.      Nose: Nose normal.      Mouth/Throat:      Mouth: Mucous membranes are moist.      Pharynx: Oropharynx is clear.   Eyes:      General:         Right eye: No discharge.         Left eye: No discharge.      Conjunctiva/sclera: Conjunctivae normal.      Pupils: Pupils are equal, round, and reactive to light.   Neck:      Musculoskeletal: Normal range of motion. No neck rigidity or muscular tenderness.   Cardiovascular:      Pulses: Normal pulses.   Pulmonary:      Effort: Pulmonary effort is normal. No respiratory distress.      Breath sounds: Normal  breath sounds.   Abdominal:      General: There is no distension.      Palpations: Abdomen is soft.      Tenderness: There is no abdominal tenderness.   Musculoskeletal:         General: No swelling, tenderness or signs of injury.   Skin:     General: Skin is warm and dry.      Capillary Refill: Capillary refill takes less than 2 seconds.   Neurological:      General: No focal deficit present.      Mental Status: She is alert and oriented to person, place, and time.      Gait: Gait normal.   Psychiatric:         Mood and Affect: Mood normal.         Behavior: Behavior normal.         Thought Content: Thought content normal.         Laboratory  Recent Results (from the past 24 hour(s))   CBC WITH DIFFERENTIAL    Collection Time: 09/26/20  8:05 PM   Result Value Ref Range    WBC 9.1 4.8 - 10.8 K/uL    RBC 4.45 4.20 - 5.40 M/uL    Hemoglobin 13.9 13.0 - 17.0 g/dL    Hematocrit 41.9 39.0 - 50.0 %    MCV 94.2 81.0 - 99.0 fL    MCH 31.2 28.4 - 32.7 pg    MCHC 33.2 33.0 - 37.0 g/dL    RDW 13.0 11.5 - 14.5 %    Platelet Count 329 130 - 400 K/uL    MPV 9.6 7.4 - 10.4 fL    Neutrophils Automated 73.7 (H) 39.0 - 70.0 %    Lymphocytes Automated 16.3 (L) 21.0 - 50.0 %    Monocytes Automated 7.9 1.7 - 10.0 %    Eosinophils Automated 1.2 0.0 - 5.0 %    Basophils Automated 0.9 0.0 - 3.0 %    Abs Neutrophils Automated 6.7 1.8 - 7.7 K/uL    Abs Lymph Automated 1.5 1.2 - 4.8 K/uL    Monos (Absolute) 0.7 0.2 - 0.9 K/uL   HEPATIC FUNCTION PANEL    Collection Time: 09/26/20  8:05 PM   Result Value Ref Range    Alkaline Phosphatase 115 38 - 126 U/L    AST(SGOT) 33 15 - 46 U/L    ALT(SGPT) 25 13 - 69 U/L    Total Bilirubin 0.4 0.2 - 1.3 mg/dL    Direct Bilirubin 0.1 0.0 - 0.2 mg/dL    Indirect Bilirubin 0.3 0.1 - 0.9 mg/dL    Albumin 4.2 3.5 - 5.0 g/dL    Total Protein 7.1 6.3 - 8.2 g/dL   Prothrombin Time    Collection Time: 09/26/20  8:05 PM   Result Value Ref Range    PT 11.9 9.3 - 12.4 sec    INR 1.11    ESTIMATED GFR    Collection  Time: 09/26/20  8:05 PM   Result Value Ref Range    GFR If  46 (A) >60 mL/min/1.73 m 2    GFR If Non  38 (A) >60 mL/min/1.73 m 2   Comp Metabolic Panel    Collection Time: 09/26/20  8:05 PM   Result Value Ref Range    Sodium 142 137 - 145 mmol/L    Potassium 3.9 3.5 - 5.1 mmol/L    Chloride 108 (H) 98 - 107 mmol/L    Co2 21 (L) 22 - 30 mmol/L    Anion Gap 13 (H) 4 - 12 mmol/L    Glucose 111 (H) 70 - 100 mg/dL    Bun 25 (H) 7 - 17 mg/dL    Creatinine 1.4 (H) 0.6 - 1.0 mg/dL    Calcium 9.4 8.7 - 10.5 mg/dL    A-G Ratio 1.4    DIAGNOSTIC ALCOHOL    Collection Time: 09/26/20  8:05 PM   Result Value Ref Range    Ethyl Alcohol -Ethanol  Etoh <0.010 0.000 - 0.010 gm/dl   APTT    Collection Time: 09/26/20  8:05 PM   Result Value Ref Range    APTT 45.3 (H) 21.8 - 33.8 sec   Routine (COVID/SARS COV-2 In-House PCR up to 24 hours)    Collection Time: 09/26/20 10:14 PM    Specimen: Nasopharyngeal; Respirate   Result Value Ref Range    COVID Order Status Received    DIAGNOSTIC ALCOHOL    Collection Time: 09/26/20 10:14 PM   Result Value Ref Range    Diagnostic Alcohol <10.1 0.0 - 10.1 mg/dL   CBC WITHOUT DIFFERENTIAL    Collection Time: 09/26/20 10:14 PM   Result Value Ref Range    WBC 14.5 (H) 4.8 - 10.8 K/uL    RBC 4.35 4.20 - 5.40 M/uL    Hemoglobin 13.8 12.0 - 16.0 g/dL    Hematocrit 41.4 37.0 - 47.0 %    MCV 95.2 81.4 - 97.8 fL    MCH 31.7 27.0 - 33.0 pg    MCHC 33.3 (L) 33.6 - 35.0 g/dL    RDW 45.2 35.9 - 50.0 fL    Platelet Count 280 164 - 446 K/uL    MPV 9.1 9.0 - 12.9 fL   Comp Metabolic Panel    Collection Time: 09/26/20 10:14 PM   Result Value Ref Range    Sodium 138 135 - 145 mmol/L    Potassium 3.9 3.6 - 5.5 mmol/L    Chloride 103 96 - 112 mmol/L    Co2 21 20 - 33 mmol/L    Anion Gap 14.0 7.0 - 16.0    Glucose 142 (H) 65 - 99 mg/dL    Bun 25 (H) 8 - 22 mg/dL    Creatinine 1.19 0.50 - 1.40 mg/dL    Calcium 8.9 8.5 - 10.5 mg/dL    AST(SGOT) 25 12 - 45 U/L    ALT(SGPT) 22 2 - 50 U/L     Alkaline Phosphatase 116 (H) 30 - 99 U/L    Total Bilirubin 0.3 0.1 - 1.5 mg/dL    Albumin 4.1 3.2 - 4.9 g/dL    Total Protein 6.7 6.0 - 8.2 g/dL    Globulin 2.6 1.9 - 3.5 g/dL    A-G Ratio 1.6 g/dL   Prothrombin Time    Collection Time: 09/26/20 10:14 PM   Result Value Ref Range    PT 14.6 12.0 - 14.6 sec    INR 1.11 0.87 - 1.13   APTT    Collection Time: 09/26/20 10:14 PM   Result Value Ref Range    APTT 65.2 (H) 24.7 - 36.0 sec   HCG QUAL SERUM    Collection Time: 09/26/20 10:14 PM   Result Value Ref Range    Beta-Hcg Qualitative Serum Negative Negative   COD - Adult (Type and Screen)    Collection Time: 09/26/20 10:14 PM   Result Value Ref Range    ABO Grouping Only AB     Rh Grouping Only POS     Antibody Screen-Cod NEG    PLATELET MAPPING WITH BASIC TEG    Collection Time: 09/26/20 10:14 PM   Result Value Ref Range    Reaction Time Initial-R 8.2 5.0 - 10.0 min    Clot Kinetics-K 2.2 1.0 - 3.0 min    Clot Angle-Angle 59.4 53.0 - 72.0 degrees    Maximum Clot Strength-MA 70.2 (H) 50.0 - 70.0 mm    Lysis 30 minutes-LY30 0.0 0.0 - 8.0 %    % Inhibition ADP 32.9 %    % Inhibition AA 87.3 %    TEG Algorithm Link Algorithm    SARS-COV Antigen OSCAR    Collection Time: 09/26/20 10:14 PM   Result Value Ref Range    SARS-CoV-2 Source NP Swab     SARS-COV ANTIGEN OSCAR NotDetected Not-Detected   ESTIMATED GFR    Collection Time: 09/26/20 10:14 PM   Result Value Ref Range    GFR If  55 (A) >60 mL/min/1.73 m 2    GFR If Non African American 45 (A) >60 mL/min/1.73 m 2   PLATELETS REQUEST    Collection Time: 09/27/20  4:02 AM   Result Value Ref Range    Component P       PI                  Plts,PheresisIRR    [id number]   issued       09/27/20   05:20      Product Type Platelets Pheresis IRR LR     Dispense Status issued     Unit Number (Barcoded) [id number]     Product Code (Barcoded) [id number]     Blood Type (Barcoded) 6200    ABO Rh Confirm    Collection Time: 09/27/20  5:00 AM   Result Value Ref Range     ABO Rh Confirm AB POS    CBC with Differential: Tomorrow AM    Collection Time: 09/27/20  5:00 AM   Result Value Ref Range    WBC 11.3 (H) 4.8 - 10.8 K/uL    RBC 3.96 (L) 4.20 - 5.40 M/uL    Hemoglobin 12.5 12.0 - 16.0 g/dL    Hematocrit 37.9 37.0 - 47.0 %    MCV 95.7 81.4 - 97.8 fL    MCH 31.6 27.0 - 33.0 pg    MCHC 33.0 (L) 33.6 - 35.0 g/dL    RDW 45.6 35.9 - 50.0 fL    Platelet Count 254 164 - 446 K/uL    MPV 9.2 9.0 - 12.9 fL    Neutrophils-Polys 85.40 (H) 44.00 - 72.00 %    Lymphocytes 8.10 (L) 22.00 - 41.00 %    Monocytes 5.50 0.00 - 13.40 %    Eosinophils 0.20 0.00 - 6.90 %    Basophils 0.40 0.00 - 1.80 %    Immature Granulocytes 0.40 0.00 - 0.90 %    Nucleated RBC 0.00 /100 WBC    Neutrophils (Absolute) 9.68 (H) 2.00 - 7.15 K/uL    Lymphs (Absolute) 0.92 (L) 1.00 - 4.80 K/uL    Monos (Absolute) 0.62 0.00 - 0.85 K/uL    Eos (Absolute) 0.02 0.00 - 0.51 K/uL    Baso (Absolute) 0.05 0.00 - 0.12 K/uL    Immature Granulocytes (abs) 0.04 0.00 - 0.11 K/uL    NRBC (Absolute) 0.00 K/uL   Comp Metabolic Panel (CMP): Tomorrow AM    Collection Time: 09/27/20  5:00 AM   Result Value Ref Range    Sodium 139 135 - 145 mmol/L    Potassium 3.9 3.6 - 5.5 mmol/L    Chloride 106 96 - 112 mmol/L    Co2 21 20 - 33 mmol/L    Anion Gap 12.0 7.0 - 16.0    Glucose 138 (H) 65 - 99 mg/dL    Bun 20 8 - 22 mg/dL    Creatinine 0.84 0.50 - 1.40 mg/dL    Calcium 8.4 (L) 8.5 - 10.5 mg/dL    AST(SGOT) 17 12 - 45 U/L    ALT(SGPT) 19 2 - 50 U/L    Alkaline Phosphatase 100 (H) 30 - 99 U/L    Total Bilirubin 0.4 0.1 - 1.5 mg/dL    Albumin 3.7 3.2 - 4.9 g/dL    Total Protein 6.0 6.0 - 8.2 g/dL    Globulin 2.3 1.9 - 3.5 g/dL    A-G Ratio 1.6 g/dL   ESTIMATED GFR    Collection Time: 09/27/20  5:00 AM   Result Value Ref Range    GFR If African American >60 >60 mL/min/1.73 m 2    GFR If Non African American >60 >60 mL/min/1.73 m 2   PLATELET MAPPING WITH BASIC TEG    Collection Time: 09/27/20  9:40 AM   Result Value Ref Range    Reaction Time Initial-R  10.2 (H) 5.0 - 10.0 min    Clot Kinetics-K 1.8 1.0 - 3.0 min    Clot Angle-Angle 61.8 53.0 - 72.0 degrees    Maximum Clot Strength-MA 69.6 50.0 - 70.0 mm    Lysis 30 minutes-LY30 0.0 0.0 - 8.0 %    % Inhibition ADP 1.7 %    % Inhibition AA 0.0 %    TEG Algorithm Link Algorithm        Fluids    Intake/Output Summary (Last 24 hours) at 9/27/2020 1251  Last data filed at 9/27/2020 0703  Gross per 24 hour   Intake 1116 ml   Output 300 ml   Net 816 ml       Core Measures & Quality Metrics  Medications reviewed and Labs reviewed  Olivier catheter: No Olivier      DVT Prophylaxis: Contraindicated - High bleeding risk  DVT prophylaxis - mechanical: SCDs  Ulcer prophylaxis: Not indicated    Assessed for rehab: Patient was assess for and/or received rehabilitation services during this hospitalization    RAP Score Total: 8    ETOH Screening  CAGE Score: 0  Assessment complete date: 9/27/2020        Assessment/Plan  Subdural hematoma, post-traumatic (HCC)- (present on admission)  Assessment & Plan  Moderate subdural hematoma posterior overlying the left occipital and parietal lobes.  Subarachnoid blood within the brain sulcations at the anterior margins, and base of the frontal lobes.  Non-operative management.   Follow up head CT with new hemorrhage in left frontal region  Post traumatic pharmacologic seizure prophylaxis for 1 week.  Speech Language Pathology cognitive evaluation.  Ronald Garcia DO. Neurosurgeon. [facility].    Hypertension- (present on admission)  Assessment & Plan  Chronic condition treated with amlodipine, metoprolol.  Definitive medication reconciliation pending.    Renal insufficiency- (present on admission)  Assessment & Plan  Abnormal renal indices on admission  Cr 1.4 / BUN 24 / GFR 38  Gentle hydration  Repeat labs improved    Aspirin long-term use- (present on admission)  Assessment & Plan  Daily aspirin 81 mg  TEG with 87.2 % AA inhibition   Transfused one unit of platelets.  Repeat with  0.0% inhibition.    Contraindication to deep vein thrombosis (DVT) prophylaxis- (present on admission)  Assessment & Plan  Initial systemic anticoagulation contraindicated secondary to elevated bleeding risk.   9/27 Surveillance venous duplex scanning ordered    Screening examination for infectious disease- (present on admission)  Assessment & Plan  Admission SARS-CoV-2 testing negative. LOW RISK patient. Repeat SARS-CoV-2 testing not indicated. Isolation precautions de-escalated.    Closed fracture of skull (HCC)- (present on admission)  Assessment & Plan  Nondisplaced fracture involving the right posterior fossa of the skull.  Non-operative management.  Ronald Garcia DO. Neurosurgeon. [facility].    Scalp laceration, initial encounter- (present on admission)  Assessment & Plan  Repaired at [facility]  Staples out 10 days    Trauma- (present on admission)  Assessment & Plan  Fell backwards on stairs. (-) LOC.  Evaluated at [facility]   Trauma Yellow Transfer Activation.  Guera Smith MD. Trauma Surgery.     Discussed patient condition with RN, Patient and Dr. Pham.

## 2020-09-28 ENCOUNTER — APPOINTMENT (OUTPATIENT)
Dept: RADIOLOGY | Facility: MEDICAL CENTER | Age: 65
DRG: 083 | End: 2020-09-28
Attending: NEUROLOGICAL SURGERY
Payer: MEDICARE

## 2020-09-28 PROBLEM — Z11.9 SCREENING EXAMINATION FOR INFECTIOUS DISEASE: Status: RESOLVED | Noted: 2020-09-26 | Resolved: 2020-09-28

## 2020-09-28 LAB
ANION GAP SERPL CALC-SCNC: 15 MMOL/L (ref 7–16)
BASOPHILS # BLD AUTO: 0.8 % (ref 0–1.8)
BASOPHILS # BLD: 0.06 K/UL (ref 0–0.12)
BUN SERPL-MCNC: 10 MG/DL (ref 8–22)
CALCIUM SERPL-MCNC: 8.6 MG/DL (ref 8.5–10.5)
CHLORIDE SERPL-SCNC: 100 MMOL/L (ref 96–112)
CO2 SERPL-SCNC: 19 MMOL/L (ref 20–33)
CREAT SERPL-MCNC: 0.67 MG/DL (ref 0.5–1.4)
EOSINOPHIL # BLD AUTO: 0.15 K/UL (ref 0–0.51)
EOSINOPHIL NFR BLD: 2 % (ref 0–6.9)
ERYTHROCYTE [DISTWIDTH] IN BLOOD BY AUTOMATED COUNT: 43.7 FL (ref 35.9–50)
GLUCOSE SERPL-MCNC: 135 MG/DL (ref 65–99)
HCT VFR BLD AUTO: 38.8 % (ref 37–47)
HGB BLD-MCNC: 13 G/DL (ref 12–16)
IMM GRANULOCYTES # BLD AUTO: 0.03 K/UL (ref 0–0.11)
IMM GRANULOCYTES NFR BLD AUTO: 0.4 % (ref 0–0.9)
LYMPHOCYTES # BLD AUTO: 1.12 K/UL (ref 1–4.8)
LYMPHOCYTES NFR BLD: 14.6 % (ref 22–41)
MCH RBC QN AUTO: 31.2 PG (ref 27–33)
MCHC RBC AUTO-ENTMCNC: 33.5 G/DL (ref 33.6–35)
MCV RBC AUTO: 93 FL (ref 81.4–97.8)
MONOCYTES # BLD AUTO: 0.41 K/UL (ref 0–0.85)
MONOCYTES NFR BLD AUTO: 5.3 % (ref 0–13.4)
NEUTROPHILS # BLD AUTO: 5.92 K/UL (ref 2–7.15)
NEUTROPHILS NFR BLD: 76.9 % (ref 44–72)
NRBC # BLD AUTO: 0 K/UL
NRBC BLD-RTO: 0 /100 WBC
PLATELET # BLD AUTO: 301 K/UL (ref 164–446)
PMV BLD AUTO: 9.4 FL (ref 9–12.9)
POTASSIUM SERPL-SCNC: 3.5 MMOL/L (ref 3.6–5.5)
RBC # BLD AUTO: 4.17 M/UL (ref 4.2–5.4)
SODIUM SERPL-SCNC: 134 MMOL/L (ref 135–145)
WBC # BLD AUTO: 7.7 K/UL (ref 4.8–10.8)

## 2020-09-28 PROCEDURE — 700102 HCHG RX REV CODE 250 W/ 637 OVERRIDE(OP): Performed by: SURGERY

## 2020-09-28 PROCEDURE — 85025 COMPLETE CBC W/AUTO DIFF WBC: CPT

## 2020-09-28 PROCEDURE — A9270 NON-COVERED ITEM OR SERVICE: HCPCS | Performed by: SURGERY

## 2020-09-28 PROCEDURE — 700102 HCHG RX REV CODE 250 W/ 637 OVERRIDE(OP): Performed by: NURSE PRACTITIONER

## 2020-09-28 PROCEDURE — 70450 CT HEAD/BRAIN W/O DYE: CPT

## 2020-09-28 PROCEDURE — 90471 IMMUNIZATION ADMIN: CPT

## 2020-09-28 PROCEDURE — 3E02340 INTRODUCTION OF INFLUENZA VACCINE INTO MUSCLE, PERCUTANEOUS APPROACH: ICD-10-PCS | Performed by: SURGERY

## 2020-09-28 PROCEDURE — 770001 HCHG ROOM/CARE - MED/SURG/GYN PRIV*

## 2020-09-28 PROCEDURE — 97165 OT EVAL LOW COMPLEX 30 MIN: CPT

## 2020-09-28 PROCEDURE — 90662 IIV NO PRSV INCREASED AG IM: CPT | Performed by: SURGERY

## 2020-09-28 PROCEDURE — 700111 HCHG RX REV CODE 636 W/ 250 OVERRIDE (IP): Performed by: SURGERY

## 2020-09-28 PROCEDURE — 700101 HCHG RX REV CODE 250: Performed by: SURGERY

## 2020-09-28 PROCEDURE — A9270 NON-COVERED ITEM OR SERVICE: HCPCS | Performed by: NURSE PRACTITIONER

## 2020-09-28 PROCEDURE — 99232 SBSQ HOSP IP/OBS MODERATE 35: CPT | Performed by: SURGERY

## 2020-09-28 PROCEDURE — 80048 BASIC METABOLIC PNL TOTAL CA: CPT

## 2020-09-28 PROCEDURE — 700111 HCHG RX REV CODE 636 W/ 250 OVERRIDE (IP): Performed by: NURSE PRACTITIONER

## 2020-09-28 RX ORDER — ATORVASTATIN CALCIUM 10 MG/1
10 TABLET, FILM COATED ORAL NIGHTLY
Status: DISCONTINUED | OUTPATIENT
Start: 2020-09-28 | End: 2020-09-29 | Stop reason: HOSPADM

## 2020-09-28 RX ORDER — SPIRONOLACTONE 25 MG/1
25 TABLET ORAL 2 TIMES DAILY
Status: DISCONTINUED | OUTPATIENT
Start: 2020-09-28 | End: 2020-09-29 | Stop reason: HOSPADM

## 2020-09-28 RX ORDER — MECLIZINE HYDROCHLORIDE 25 MG/1
25 TABLET ORAL 3 TIMES DAILY
Status: DISCONTINUED | OUTPATIENT
Start: 2020-09-28 | End: 2020-09-29 | Stop reason: HOSPADM

## 2020-09-28 RX ORDER — ENALAPRILAT 1.25 MG/ML
1.25 INJECTION INTRAVENOUS EVERY 6 HOURS
Status: DISCONTINUED | OUTPATIENT
Start: 2020-09-28 | End: 2020-09-28

## 2020-09-28 RX ORDER — POTASSIUM CHLORIDE 20 MEQ/1
40 TABLET, EXTENDED RELEASE ORAL ONCE
Status: COMPLETED | OUTPATIENT
Start: 2020-09-28 | End: 2020-09-28

## 2020-09-28 RX ORDER — DULOXETIN HYDROCHLORIDE 30 MG/1
30 CAPSULE, DELAYED RELEASE ORAL 2 TIMES DAILY
Status: DISCONTINUED | OUTPATIENT
Start: 2020-09-28 | End: 2020-09-29 | Stop reason: HOSPADM

## 2020-09-28 RX ORDER — LABETALOL HYDROCHLORIDE 5 MG/ML
10-20 INJECTION, SOLUTION INTRAVENOUS EVERY 6 HOURS PRN
Status: DISCONTINUED | OUTPATIENT
Start: 2020-09-28 | End: 2020-09-29 | Stop reason: HOSPADM

## 2020-09-28 RX ORDER — AMLODIPINE BESYLATE 5 MG/1
5 TABLET ORAL DAILY
Status: DISCONTINUED | OUTPATIENT
Start: 2020-09-28 | End: 2020-09-29 | Stop reason: HOSPADM

## 2020-09-28 RX ORDER — HEPARIN SODIUM 5000 [USP'U]/ML
5000 INJECTION, SOLUTION INTRAVENOUS; SUBCUTANEOUS EVERY 8 HOURS
Status: DISCONTINUED | OUTPATIENT
Start: 2020-09-28 | End: 2020-09-29 | Stop reason: HOSPADM

## 2020-09-28 RX ORDER — ONDANSETRON 4 MG/1
4 TABLET, ORALLY DISINTEGRATING ORAL EVERY 4 HOURS PRN
Status: DISCONTINUED | OUTPATIENT
Start: 2020-09-28 | End: 2020-09-29 | Stop reason: HOSPADM

## 2020-09-28 RX ORDER — BUPROPION HYDROCHLORIDE 100 MG/1
200 TABLET ORAL 2 TIMES DAILY
Status: DISCONTINUED | OUTPATIENT
Start: 2020-09-28 | End: 2020-09-29 | Stop reason: HOSPADM

## 2020-09-28 RX ORDER — HYDRALAZINE HYDROCHLORIDE 25 MG/1
25 TABLET, FILM COATED ORAL EVERY 6 HOURS PRN
Status: DISCONTINUED | OUTPATIENT
Start: 2020-09-28 | End: 2020-09-29 | Stop reason: HOSPADM

## 2020-09-28 RX ORDER — FUROSEMIDE 20 MG/1
20 TABLET ORAL DAILY
Status: DISCONTINUED | OUTPATIENT
Start: 2020-09-28 | End: 2020-09-29 | Stop reason: HOSPADM

## 2020-09-28 RX ADMIN — ACETAMINOPHEN 650 MG: 325 TABLET, FILM COATED ORAL at 00:14

## 2020-09-28 RX ADMIN — FUROSEMIDE 20 MG: 20 TABLET ORAL at 15:53

## 2020-09-28 RX ADMIN — ACETAMINOPHEN 650 MG: 325 TABLET, FILM COATED ORAL at 22:06

## 2020-09-28 RX ADMIN — HYDRALAZINE HYDROCHLORIDE 25 MG: 25 TABLET, FILM COATED ORAL at 23:03

## 2020-09-28 RX ADMIN — BUPROPION HYDROCHLORIDE 200 MG: 100 TABLET, FILM COATED ORAL at 17:10

## 2020-09-28 RX ADMIN — ACETAMINOPHEN 650 MG: 325 TABLET, FILM COATED ORAL at 17:18

## 2020-09-28 RX ADMIN — OXYCODONE 5 MG: 5 TABLET ORAL at 18:55

## 2020-09-28 RX ADMIN — ATORVASTATIN CALCIUM 10 MG: 10 TABLET, FILM COATED ORAL at 22:05

## 2020-09-28 RX ADMIN — METOPROLOL TARTRATE 12.5 MG: 25 TABLET, FILM COATED ORAL at 17:07

## 2020-09-28 RX ADMIN — HEPARIN SODIUM 5000 UNITS: 5000 INJECTION, SOLUTION INTRAVENOUS; SUBCUTANEOUS at 22:06

## 2020-09-28 RX ADMIN — DOCUSATE SODIUM 50 MG AND SENNOSIDES 8.6 MG 1 TABLET: 8.6; 5 TABLET, FILM COATED ORAL at 22:06

## 2020-09-28 RX ADMIN — DOCUSATE SODIUM 100 MG: 100 CAPSULE, LIQUID FILLED ORAL at 05:52

## 2020-09-28 RX ADMIN — AMLODIPINE BESYLATE 5 MG: 10 TABLET ORAL at 14:41

## 2020-09-28 RX ADMIN — ACETAMINOPHEN 650 MG: 325 TABLET, FILM COATED ORAL at 10:30

## 2020-09-28 RX ADMIN — HEPARIN SODIUM 5000 UNITS: 5000 INJECTION, SOLUTION INTRAVENOUS; SUBCUTANEOUS at 14:41

## 2020-09-28 RX ADMIN — ONDANSETRON 4 MG: 4 TABLET, ORALLY DISINTEGRATING ORAL at 17:09

## 2020-09-28 RX ADMIN — INFLUENZA A VIRUS A/MICHIGAN/45/2015 X-275 (H1N1) ANTIGEN (FORMALDEHYDE INACTIVATED), INFLUENZA A VIRUS A/SINGAPORE/INFIMH-16-0019/2016 IVR-186 (H3N2) ANTIGEN (FORMALDEHYDE INACTIVATED), INFLUENZA B VIRUS B/PHUKET/3073/2013 ANTIGEN (FORMALDEHYDE INACTIVATED), AND INFLUENZA B VIRUS B/MARYLAND/15/2016 BX-69A ANTIGEN (FORMALDEHYDE INACTIVATED) 0.7 ML: 60; 60; 60; 60 INJECTION, SUSPENSION INTRAMUSCULAR at 18:53

## 2020-09-28 RX ADMIN — OXYCODONE 5 MG: 5 TABLET ORAL at 05:52

## 2020-09-28 RX ADMIN — LABETALOL HYDROCHLORIDE 10 MG: 5 INJECTION, SOLUTION INTRAVENOUS at 03:30

## 2020-09-28 RX ADMIN — LEVETIRACETAM 500 MG: 500 TABLET ORAL at 05:53

## 2020-09-28 RX ADMIN — ACETAMINOPHEN 650 MG: 325 TABLET, FILM COATED ORAL at 05:53

## 2020-09-28 RX ADMIN — DULOXETINE HYDROCHLORIDE 30 MG: 30 CAPSULE, DELAYED RELEASE ORAL at 17:09

## 2020-09-28 RX ADMIN — LEVETIRACETAM 500 MG: 500 TABLET ORAL at 17:07

## 2020-09-28 RX ADMIN — SPIRONOLACTONE 25 MG: 25 TABLET ORAL at 17:10

## 2020-09-28 RX ADMIN — POTASSIUM CHLORIDE 40 MEQ: 1500 TABLET, EXTENDED RELEASE ORAL at 10:30

## 2020-09-28 RX ADMIN — OXYCODONE 5 MG: 5 TABLET ORAL at 01:29

## 2020-09-28 RX ADMIN — POLYETHYLENE GLYCOL 3350 1 PACKET: 17 POWDER, FOR SOLUTION ORAL at 17:07

## 2020-09-28 RX ADMIN — OXYCODONE 5 MG: 5 TABLET ORAL at 17:09

## 2020-09-28 RX ADMIN — MORPHINE SULFATE 2 MG: 4 INJECTION INTRAVENOUS at 02:16

## 2020-09-28 RX ADMIN — MECLIZINE HYDROCHLORIDE 25 MG: 25 TABLET ORAL at 17:31

## 2020-09-28 RX ADMIN — ACETAMINOPHEN 650 MG: 325 TABLET, FILM COATED ORAL at 14:41

## 2020-09-28 RX ADMIN — DOCUSATE SODIUM 100 MG: 100 CAPSULE, LIQUID FILLED ORAL at 17:09

## 2020-09-28 ASSESSMENT — COGNITIVE AND FUNCTIONAL STATUS - GENERAL
SUGGESTED CMS G CODE MODIFIER DAILY ACTIVITY: CJ
DAILY ACTIVITIY SCORE: 21
DRESSING REGULAR LOWER BODY CLOTHING: A LITTLE
TOILETING: A LITTLE
HELP NEEDED FOR BATHING: A LITTLE

## 2020-09-28 ASSESSMENT — ENCOUNTER SYMPTOMS
HEADACHES: 1
PSYCHIATRIC NEGATIVE: 1
RESPIRATORY NEGATIVE: 1
ROS GI COMMENTS: BM
MYALGIAS: 1

## 2020-09-28 ASSESSMENT — PAIN DESCRIPTION - PAIN TYPE
TYPE: ACUTE PAIN

## 2020-09-28 ASSESSMENT — ACTIVITIES OF DAILY LIVING (ADL): TOILETING: INDEPENDENT

## 2020-09-28 ASSESSMENT — FIBROSIS 4 INDEX: FIB4 SCORE: 0.84

## 2020-09-28 NOTE — THERAPY
"Occupational Therapy   Initial Evaluation     Patient Name: Ling Ford  Age:  65 y.o., Sex:  female  Medical Record #: [id number]  Today's Date: 9/28/2020     Precautions: Fall Risk    Assessment    Patient is 65 y.o. female s/p GLF with resultant SDH. Seen now for OT eval. Pt demos intact basic cognition, UEs grossly WFL, vision intact, mild balance impairment. Will follow to monitor for consistency of performance and to progress standing ADL. Anticipate 1-2 additional txs and no further OT needs on DC.     Plan    Recommend Occupational Therapy 3 times per week until therapy goals are met for the following treatments:  Neuro Re-Education / Balance, Self Care/Activities of Daily Living and Therapeutic Activities.    DC Equipment Recommendations: Unable to determine at this time  Discharge Recommendations: Anticipate that the patient will have no further occupational therapy needs after discharge from the hospital     Subjective    \"I have a headache, but otherwise feel okay\"     Objective       09/28/20 1212   Prior Living Situation   Housing / Facility 2 Story House  (basement )   Steps In Home   (full flight )   Rail Left Rail (Steps in Home)   Bathroom Set up Bathtub / Shower Combination   Comments Pt resides with adult daughter and her 4 kids (ages 10-17) in [place]. House has a basement that pt only needs to access for laundry. Pt was completely independent PTA. Reports she will initially go to her brother's in [place] (1 story, 1 step entry, tub/shower)   Prior Level of ADL Function   Self Feeding Independent   Grooming / Hygiene Independent   Bathing Independent   Dressing Independent   Toileting Independent   Prior Level of IADL Function   Medication Management Independent   Laundry Independent   Kitchen Mobility Independent   Finances Independent   Home Management Independent   Shopping Independent   Prior Level Of Mobility Independent Without Device in Community;Independent Without Device in Home   Driving / " Transportation Driving Independent   Cognition    Cognition / Consciousness WDL   Comments very pleasant & cooperative    Active ROM Upper Body   Active ROM Upper Body  WDL   Strength Upper Body   Upper Body Strength  WDL   Coordination Upper Body   Coordination WDL   Bed Mobility    Supine to Sit Supervised  (flat bed, no rail )   Scooting Supervised  (seated)   ADL Assessment   Grooming Supervision;Seated  (oral care in chair )   Lower Body Dressing Minimal Assist  (sock management )   Toileting   (NT; declined need)   Functional Mobility   Sit to Stand Supervised   Bed, Chair, Wheelchair Transfer Supervised   Transfer Method Stand Pivot  (no AD)   Short Term Goals   Short Term Goal # 1 Pt will complete standing grooming with supv    Short Term Goal # 2 Pt will complete FB dressing with supv    Short Term Goal # 3 Pt will complete toileting with supv

## 2020-09-28 NOTE — CARE PLAN
Problem: Safety  Goal: Will remain free from injury  Outcome: PROGRESSING AS EXPECTED     Problem: Knowledge Deficit  Goal: Knowledge of the prescribed therapeutic regimen will improve  Outcome: PROGRESSING AS EXPECTED     Problem: Pain Management  Goal: Pain level will decrease to patient's comfort goal  Outcome: PROGRESSING AS EXPECTED

## 2020-09-28 NOTE — PROGRESS NOTES
APN progress note to follow.  Doing well.  Positive for Headache    CT head stable from this am    Okay for stepdown  Okay for Q4 neuro checks  Okay for heparin DVT ppx  Will need CT head prior to follow up out patient in 2-3 weeks.

## 2020-09-28 NOTE — PROGRESS NOTES
Trauma / Surgical Daily Progress Note    Date of Service  9/28/2020    Chief Complaint  65 y.o. female admitted 9/26/2020 as a trauma yellow transfer - fall down stairs - non op ICB    Interval Events  Tertiary complete - no further findings   RAP/SBIRT complete  Neurosurgyer recommendations reviewed   Multiple home meds resumed.  Duplex negative  Heparin SQ initiated.  Adequate pain control  A bit dizzy with mobilizing - added scheduled Antivert.   Transfer to [facility].    Lives in [place] - will stay in [place] with her family for a bit and then her brother will drive her back to [place]. Anticipate home in the next 24-48 hours without needs.    Review of Systems  Review of Systems   Constitutional: Positive for malaise/fatigue.   HENT: Negative.    Respiratory: Negative.    Gastrointestinal:        BM   Genitourinary:        Voiding   Musculoskeletal: Positive for myalgias.   Neurological: Positive for headaches.   Psychiatric/Behavioral: Negative.    All other systems reviewed and are negative.       Vital Signs  Temp:  [36.1 °C (97 °F)-36.4 °C (97.5 °F)] 36.3 °C (97.4 °F)  Pulse:  [] 83  Resp:  [15-31] 18  BP: (134-178)/(53-80) 154/80  SpO2:  [91 %-97 %] 97 %    Physical Exam  Physical Exam  Vitals signs and nursing note reviewed.   Constitutional:       General: She is not in acute distress.     Appearance: Normal appearance. She is obese.      Interventions: Nasal cannula in place.   HENT:      Head: Laceration (stapled posterior) present.      Right Ear: External ear normal.      Left Ear: External ear normal.      Nose: Nose normal.      Mouth/Throat:      Mouth: Mucous membranes are moist.      Pharynx: Oropharynx is clear.   Eyes:      General:         Right eye: No discharge.         Left eye: No discharge.      Conjunctiva/sclera: Conjunctivae normal.      Pupils: Pupils are equal, round, and reactive to light.   Neck:      Musculoskeletal: Normal range of motion. No neck rigidity or muscular tenderness.    Cardiovascular:      Pulses: Normal pulses.   Pulmonary:      Effort: Pulmonary effort is normal. No respiratory distress.      Breath sounds: Normal breath sounds.   Abdominal:      General: There is no distension.      Palpations: Abdomen is soft.      Tenderness: There is no abdominal tenderness.   Musculoskeletal:         General: No swelling, tenderness or signs of injury.   Skin:     General: Skin is warm and dry.      Capillary Refill: Capillary refill takes less than 2 seconds.   Neurological:      General: No focal deficit present.      Mental Status: She is alert and oriented to person, place, and time.      Gait: Gait normal.   Psychiatric:         Mood and Affect: Mood normal.         Behavior: Behavior normal.         Thought Content: Thought content normal.         Laboratory  Recent Results (from the past 24 hour(s))   CBC WITH DIFFERENTIAL    Collection Time: 09/28/20  5:45 AM   Result Value Ref Range    WBC 7.7 4.8 - 10.8 K/uL    RBC 4.17 (L) 4.20 - 5.40 M/uL    Hemoglobin 13.0 12.0 - 16.0 g/dL    Hematocrit 38.8 37.0 - 47.0 %    MCV 93.0 81.4 - 97.8 fL    MCH 31.2 27.0 - 33.0 pg    MCHC 33.5 (L) 33.6 - 35.0 g/dL    RDW 43.7 35.9 - 50.0 fL    Platelet Count 301 164 - 446 K/uL    MPV 9.4 9.0 - 12.9 fL    Neutrophils-Polys 76.90 (H) 44.00 - 72.00 %    Lymphocytes 14.60 (L) 22.00 - 41.00 %    Monocytes 5.30 0.00 - 13.40 %    Eosinophils 2.00 0.00 - 6.90 %    Basophils 0.80 0.00 - 1.80 %    Immature Granulocytes 0.40 0.00 - 0.90 %    Nucleated RBC 0.00 /100 WBC    Neutrophils (Absolute) 5.92 2.00 - 7.15 K/uL    Lymphs (Absolute) 1.12 1.00 - 4.80 K/uL    Monos (Absolute) 0.41 0.00 - 0.85 K/uL    Eos (Absolute) 0.15 0.00 - 0.51 K/uL    Baso (Absolute) 0.06 0.00 - 0.12 K/uL    Immature Granulocytes (abs) 0.03 0.00 - 0.11 K/uL    NRBC (Absolute) 0.00 K/uL   Basic Metabolic Panel    Collection Time: 09/28/20  5:45 AM   Result Value Ref Range    Sodium 134 (L) 135 - 145 mmol/L    Potassium 3.5 (L) 3.6 - 5.5  mmol/L    Chloride 100 96 - 112 mmol/L    Co2 19 (L) 20 - 33 mmol/L    Glucose 135 (H) 65 - 99 mg/dL    Bun 10 8 - 22 mg/dL    Creatinine 0.67 0.50 - 1.40 mg/dL    Calcium 8.6 8.5 - 10.5 mg/dL    Anion Gap 15.0 7.0 - 16.0   ESTIMATED GFR    Collection Time: 09/28/20  5:45 AM   Result Value Ref Range    GFR If African American >60 >60 mL/min/1.73 m 2    GFR If Non African American >60 >60 mL/min/1.73 m 2       Fluids    Intake/Output Summary (Last 24 hours) at 9/28/2020 1351  Last data filed at 9/28/2020 1000  Gross per 24 hour   Intake 600 ml   Output 2325 ml   Net -1725 ml       Core Measures & Quality Metrics  Medications reviewed and Labs reviewed  Olivier catheter: No Olivier      DVT Prophylaxis: Heparin  DVT prophylaxis - mechanical: SCDs  Ulcer prophylaxis: Not indicated    Assessed for rehab: Patient was assess for and/or received rehabilitation services during this hospitalization    RAP Score Total: 8    ETOH Screening  CAGE Score: 0  Assessment complete date: 9/27/2020        Assessment/Plan  Subdural hematoma, post-traumatic (HCC)- (present on admission)  Assessment & Plan  Moderate subdural hematoma posterior overlying the left occipital and parietal lobes.  Subarachnoid blood within the brain sulcations at the anterior margins, and base of the frontal lobes.  Non-operative management.   Follow up head CT with new hemorrhage in left frontal region  Post traumatic pharmacologic seizure prophylaxis for 1 week.  Speech Language Pathology cognitive evaluation.  Ronald Garcia DO. Neurosurgeon. [facility].    Hypertension- (present on admission)  Assessment & Plan  Chronic condition treated with amlodipine, metoprolol.  9/28 Resumed maintenance medication.    Renal insufficiency- (present on admission)  Assessment & Plan  Abnormal renal indices on admission  Cr 1.4 / BUN 24 / GFR 38  Gentle hydration  Repeat labs improved    Aspirin long-term use- (present on admission)  Assessment & Plan  Daily  aspirin 81 mg  TEG with 87.2 % AA inhibition   Transfused one unit of platelets.  Repeat with 0.0% inhibition.    Contraindication to deep vein thrombosis (DVT) prophylaxis- (present on admission)  Assessment & Plan  Initial systemic anticoagulation contraindicated secondary to elevated bleeding risk.   9/27 Surveillance venous duplex scanning ordered    Closed fracture of skull (HCC)- (present on admission)  Assessment & Plan  Nondisplaced fracture involving the right posterior fossa of the skull.  Non-operative management.  Ronald Garcia DO. Neurosurgeon. [facility].    Scalp laceration, initial encounter- (present on admission)  Assessment & Plan  Repaired at [facility]  Staples out 10 days    Trauma- (present on admission)  Assessment & Plan  Fell backwards on stairs. (-) LOC.  Evaluated at [facility]   Trauma Yellow Transfer Activation.  Guera Smith MD. Trauma Surgery.     Discussed patient condition with RN, Patient and trauma surgery. Dr. Pham

## 2020-09-28 NOTE — PROGRESS NOTES
Blood pressure remains elevated despite pain control. Dr Pham notified and ordered PRN labetalol and vasotec and wants home meds reordered. Doses of home meds are not documented as patient is unsure of her doses.

## 2020-09-28 NOTE — PROGRESS NOTES
Neurosurgery Progress Note    Subjective:  Sitting in bed, describes modest headache, seen earlier by Dr. Garcia    Exam:  AO x 3, CN2-12 grossly intact, no pronator drift, no other motor deficit    CT head 20: stable as reviewed by Dr. Garcia    BP  Min: 140/65  Max: 178/77  Pulse  Av.9  Min: 70  Max: 94  Resp  Av.6  Min: 15  Max: 31  Temp  Av.2 °C (97.2 °F)  Min: 36.1 °C (97 °F)  Max: 36.4 °C (97.5 °F)  SpO2  Av.3 %  Min: 91 %  Max: 97 %    No data recorded    Recent Labs     20  0545   WBC 14.5* 11.3* 7.7   RBC 4.35 3.96* 4.17*   HEMOGLOBIN 13.8 12.5 13.0   HEMATOCRIT 41.4 37.9 38.8   MCV 95.2 95.7 93.0   MCH 31.7 31.6 31.2   MCHC 33.3* 33.0* 33.5*   RDW 45.2 45.6 43.7   PLATELETCT 280 254 301   MPV 9.1 9.2 9.4     Recent Labs     20  0545   SODIUM 138 139 134*   POTASSIUM 3.9 3.9 3.5*   CHLORIDE 103 106 100   CO2 21 21 19*   GLUCOSE 142* 138* 135*   BUN 25* 20 10   CREATININE 1.19 0.84 0.67   CALCIUM 8.9 8.4* 8.6     Recent Labs     20   APTT 45.3* 65.2*   INR 1.11 1.11     Recent Labs     20  0940   REACTMIN 8.2 10.2*   CLOTKINET 2.2 1.8   CLOTANGL 59.4 61.8   MAXCLOTS 70.2* 69.6   EWR52VTB 0.0 0.0   PRCINADP 32.9 1.7   PRCINAA 87.3 0.0       Intake/Output       20 07 - 20 0659 20 - 20 0659       Total  Total       Intake    P.O.  840  240 1080  --  -- --    P.O.  -- -- --    I.V.  700  -- 700  --  -- --    Volume (mL) (NS infusion) 700 -- 700 -- -- --    Blood  496  -- 496  --  -- --    Volume (RELEASE PLATELET PHERESIS) 496 -- 496 -- -- --    IV Piggyback  100  -- 100  --  -- --    Volume (mL) (levETIRAcetam (Keppra) 500 mg in 100 mL NaCl IV premix) 100 -- 100 -- -- --    Total Intake 6336 240 2376 -- -- --       Output    Urine  950  2376 2365  --  -- --    Number of Times Voided --  2 x 2 x -- -- --    Urine Void (mL) 950 1525 2475 -- -- --    Total Output 950 1525 2475 -- -- --       Net I/O     1186 -1285 -99 -- -- --            Intake/Output Summary (Last 24 hours) at 9/28/2020 1019  Last data filed at 9/28/2020 0600  Gross per 24 hour   Intake 1540 ml   Output 2125 ml   Net -585 ml            • labetalol  10-20 mg Q6HRS PRN   • hydrALAZINE  25 mg Q6HRS PRN   • potassium chloride SA  40 mEq Once   • levETIRAcetam  500 mg BID   • morphine injection  2 mg Q3HRS PRN   • oxyCODONE immediate-release  5-10 mg Q4HRS PRN   • acetaminophen  650 mg Q4HRS   • Respiratory Therapy Consult   Continuous RT   • docusate sodium  100 mg BID   • senna-docusate  1 Tab Nightly   • senna-docusate  1 Tab Q24HRS PRN   • polyethylene glycol/lytes  1 Packet BID   • magnesium hydroxide  30 mL DAILY   • bisacodyl  10 mg Q24HRS PRN   • fleet  1 Each Once PRN   • ondansetron  4 mg Q4HRS PRN   • influenza Vac High-Dose Quad  0.7 mL Once PRN       Assessment and Plan:  Hospital day # 3 TBI r/t fall  Left SDH, tSAH bilateral, parenchymal contusions; f/u imaging and neuro status stable  premorbid daily ASA 81 mg: sp platelet infusion, repeat TEG 9/27/20 with 0 inhibition    POD #NA  Prophylactic anticoagulation: yes         Start date/time: ok to start Heparin 5000 units SQ tid from Nsx view    Okay for stepdown  Okay for Q4 neuro checks  Okay for heparin DVT ppx  Follow up outpatient with  CT head in 2-3 weeks.         D/w Dr. Garcia, seen earlier by DR. Garcia: see separate note.

## 2020-09-28 NOTE — CARE PLAN
Problem: Safety  Goal: Will remain free from falls  Intervention: Assess risk factors for falls  Note: Discuss using call light, bed alarm and chair alarm.      Problem: Pain Management  Goal: Pain level will decrease to patient's comfort goal  Intervention: Follow pain managment plan developed in collaboration with patient and Interdisciplinary Team  Note: Use pharamcological and nonpharmacological methods to control pain.

## 2020-09-28 NOTE — PROGRESS NOTES
2 RN skin check completed   - Wound noted to posterior head, staples present  - Redness under pannus

## 2020-09-29 VITALS
WEIGHT: 240.96 LBS | HEART RATE: 87 BPM | OXYGEN SATURATION: 96 % | BODY MASS INDEX: 40.15 KG/M2 | RESPIRATION RATE: 18 BRPM | TEMPERATURE: 97.9 F | HEIGHT: 65 IN | SYSTOLIC BLOOD PRESSURE: 126 MMHG | DIASTOLIC BLOOD PRESSURE: 75 MMHG

## 2020-09-29 LAB
ANION GAP SERPL CALC-SCNC: 14 MMOL/L (ref 7–16)
BASOPHILS # BLD AUTO: 0.6 % (ref 0–1.8)
BASOPHILS # BLD: 0.05 K/UL (ref 0–0.12)
BUN SERPL-MCNC: 9 MG/DL (ref 8–22)
CALCIUM SERPL-MCNC: 9.2 MG/DL (ref 8.5–10.5)
CHLORIDE SERPL-SCNC: 98 MMOL/L (ref 96–112)
CO2 SERPL-SCNC: 23 MMOL/L (ref 20–33)
CREAT SERPL-MCNC: 0.56 MG/DL (ref 0.5–1.4)
EOSINOPHIL # BLD AUTO: 0.1 K/UL (ref 0–0.51)
EOSINOPHIL NFR BLD: 1.2 % (ref 0–6.9)
ERYTHROCYTE [DISTWIDTH] IN BLOOD BY AUTOMATED COUNT: 43.4 FL (ref 35.9–50)
GLUCOSE SERPL-MCNC: 113 MG/DL (ref 65–99)
HCT VFR BLD AUTO: 41.7 % (ref 37–47)
HGB BLD-MCNC: 13.6 G/DL (ref 12–16)
IMM GRANULOCYTES # BLD AUTO: 0.04 K/UL (ref 0–0.11)
IMM GRANULOCYTES NFR BLD AUTO: 0.5 % (ref 0–0.9)
LYMPHOCYTES # BLD AUTO: 0.96 K/UL (ref 1–4.8)
LYMPHOCYTES NFR BLD: 11.8 % (ref 22–41)
MCH RBC QN AUTO: 30.9 PG (ref 27–33)
MCHC RBC AUTO-ENTMCNC: 32.6 G/DL (ref 33.6–35)
MCV RBC AUTO: 94.8 FL (ref 81.4–97.8)
MONOCYTES # BLD AUTO: 0.38 K/UL (ref 0–0.85)
MONOCYTES NFR BLD AUTO: 4.7 % (ref 0–13.4)
NEUTROPHILS # BLD AUTO: 6.59 K/UL (ref 2–7.15)
NEUTROPHILS NFR BLD: 81.2 % (ref 44–72)
NRBC # BLD AUTO: 0 K/UL
NRBC BLD-RTO: 0 /100 WBC
PLATELET # BLD AUTO: 325 K/UL (ref 164–446)
PMV BLD AUTO: 9.3 FL (ref 9–12.9)
POTASSIUM SERPL-SCNC: 4 MMOL/L (ref 3.6–5.5)
RBC # BLD AUTO: 4.4 M/UL (ref 4.2–5.4)
SODIUM SERPL-SCNC: 135 MMOL/L (ref 135–145)
WBC # BLD AUTO: 8.1 K/UL (ref 4.8–10.8)

## 2020-09-29 PROCEDURE — 90662 IIV NO PRSV INCREASED AG IM: CPT | Performed by: SURGERY

## 2020-09-29 PROCEDURE — 85025 COMPLETE CBC W/AUTO DIFF WBC: CPT

## 2020-09-29 PROCEDURE — 700111 HCHG RX REV CODE 636 W/ 250 OVERRIDE (IP): Performed by: SURGERY

## 2020-09-29 PROCEDURE — A9270 NON-COVERED ITEM OR SERVICE: HCPCS | Performed by: NURSE PRACTITIONER

## 2020-09-29 PROCEDURE — 80048 BASIC METABOLIC PNL TOTAL CA: CPT

## 2020-09-29 PROCEDURE — 700111 HCHG RX REV CODE 636 W/ 250 OVERRIDE (IP): Performed by: NURSE PRACTITIONER

## 2020-09-29 PROCEDURE — 97161 PT EVAL LOW COMPLEX 20 MIN: CPT

## 2020-09-29 PROCEDURE — 3E02340 INTRODUCTION OF INFLUENZA VACCINE INTO MUSCLE, PERCUTANEOUS APPROACH: ICD-10-PCS | Performed by: SURGERY

## 2020-09-29 PROCEDURE — 700102 HCHG RX REV CODE 250 W/ 637 OVERRIDE(OP): Performed by: NURSE PRACTITIONER

## 2020-09-29 PROCEDURE — 90471 IMMUNIZATION ADMIN: CPT

## 2020-09-29 RX ORDER — BUTALBITAL, ACETAMINOPHEN AND CAFFEINE 50; 325; 40 MG/1; MG/1; MG/1
1 TABLET ORAL EVERY 6 HOURS PRN
Qty: 20 TAB | Refills: 0 | Status: SHIPPED | OUTPATIENT
Start: 2020-09-29 | End: 2020-10-06

## 2020-09-29 RX ORDER — LEVETIRACETAM 500 MG/1
500 TABLET ORAL 2 TIMES DAILY
Qty: 10 TAB | Refills: 0 | Status: SHIPPED | OUTPATIENT
Start: 2020-09-29 | End: 2020-10-04

## 2020-09-29 RX ORDER — BUTALBITAL, ACETAMINOPHEN AND CAFFEINE 50; 325; 40 MG/1; MG/1; MG/1
1 TABLET ORAL EVERY 6 HOURS PRN
Status: DISCONTINUED | OUTPATIENT
Start: 2020-09-29 | End: 2020-09-29 | Stop reason: HOSPADM

## 2020-09-29 RX ADMIN — ACETAMINOPHEN 325 MG: 325 TABLET, FILM COATED ORAL at 09:27

## 2020-09-29 RX ADMIN — AMLODIPINE BESYLATE 5 MG: 10 TABLET ORAL at 05:25

## 2020-09-29 RX ADMIN — MECLIZINE HYDROCHLORIDE 25 MG: 25 TABLET ORAL at 17:15

## 2020-09-29 RX ADMIN — HEPARIN SODIUM 5000 UNITS: 5000 INJECTION, SOLUTION INTRAVENOUS; SUBCUTANEOUS at 15:06

## 2020-09-29 RX ADMIN — BUPROPION HYDROCHLORIDE 200 MG: 100 TABLET, FILM COATED ORAL at 17:15

## 2020-09-29 RX ADMIN — SPIRONOLACTONE 25 MG: 25 TABLET ORAL at 17:15

## 2020-09-29 RX ADMIN — ACETAMINOPHEN 650 MG: 325 TABLET, FILM COATED ORAL at 15:07

## 2020-09-29 RX ADMIN — FUROSEMIDE 20 MG: 20 TABLET ORAL at 05:25

## 2020-09-29 RX ADMIN — METOPROLOL TARTRATE 12.5 MG: 25 TABLET, FILM COATED ORAL at 05:25

## 2020-09-29 RX ADMIN — INFLUENZA A VIRUS A/MICHIGAN/45/2015 X-275 (H1N1) ANTIGEN (FORMALDEHYDE INACTIVATED), INFLUENZA A VIRUS A/SINGAPORE/INFIMH-16-0019/2016 IVR-186 (H3N2) ANTIGEN (FORMALDEHYDE INACTIVATED), INFLUENZA B VIRUS B/PHUKET/3073/2013 ANTIGEN (FORMALDEHYDE INACTIVATED), AND INFLUENZA B VIRUS B/MARYLAND/15/2016 BX-69A ANTIGEN (FORMALDEHYDE INACTIVATED) 0.7 ML: 60; 60; 60; 60 INJECTION, SUSPENSION INTRAMUSCULAR at 18:00

## 2020-09-29 RX ADMIN — LEVETIRACETAM 500 MG: 500 TABLET ORAL at 17:15

## 2020-09-29 RX ADMIN — DULOXETINE HYDROCHLORIDE 30 MG: 30 CAPSULE, DELAYED RELEASE ORAL at 05:25

## 2020-09-29 RX ADMIN — ACETAMINOPHEN 650 MG: 325 TABLET, FILM COATED ORAL at 05:25

## 2020-09-29 RX ADMIN — HEPARIN SODIUM 5000 UNITS: 5000 INJECTION, SOLUTION INTRAVENOUS; SUBCUTANEOUS at 05:25

## 2020-09-29 RX ADMIN — OXYCODONE 5 MG: 5 TABLET ORAL at 00:00

## 2020-09-29 RX ADMIN — MECLIZINE HYDROCHLORIDE 25 MG: 25 TABLET ORAL at 12:51

## 2020-09-29 RX ADMIN — DULOXETINE HYDROCHLORIDE 30 MG: 30 CAPSULE, DELAYED RELEASE ORAL at 17:15

## 2020-09-29 RX ADMIN — BUTALBITAL, ACETAMINOPHEN, AND CAFFEINE 1 TABLET: 50; 325; 40 TABLET, COATED ORAL at 12:54

## 2020-09-29 RX ADMIN — SPIRONOLACTONE 25 MG: 25 TABLET ORAL at 05:25

## 2020-09-29 RX ADMIN — BUTALBITAL, ACETAMINOPHEN, AND CAFFEINE 1 TABLET: 50; 325; 40 TABLET, COATED ORAL at 02:05

## 2020-09-29 RX ADMIN — METOPROLOL TARTRATE 12.5 MG: 25 TABLET, FILM COATED ORAL at 17:15

## 2020-09-29 RX ADMIN — BUPROPION HYDROCHLORIDE 200 MG: 100 TABLET, FILM COATED ORAL at 05:25

## 2020-09-29 RX ADMIN — ONDANSETRON 4 MG: 4 TABLET, ORALLY DISINTEGRATING ORAL at 00:19

## 2020-09-29 RX ADMIN — MECLIZINE HYDROCHLORIDE 25 MG: 25 TABLET ORAL at 05:25

## 2020-09-29 RX ADMIN — LEVETIRACETAM 500 MG: 500 TABLET ORAL at 05:25

## 2020-09-29 ASSESSMENT — ENCOUNTER SYMPTOMS
RESPIRATORY NEGATIVE: 1
HEADACHES: 1
PSYCHIATRIC NEGATIVE: 1
MYALGIAS: 1

## 2020-09-29 ASSESSMENT — GAIT ASSESSMENTS
GAIT LEVEL OF ASSIST: MINIMAL ASSIST
ASSISTIVE DEVICE: FRONT WHEEL WALKER;NONE
DISTANCE (FEET): 200

## 2020-09-29 ASSESSMENT — COGNITIVE AND FUNCTIONAL STATUS - GENERAL
MOVING FROM LYING ON BACK TO SITTING ON SIDE OF FLAT BED: A LITTLE
MOVING TO AND FROM BED TO CHAIR: A LITTLE
MOBILITY SCORE: 20
CLIMB 3 TO 5 STEPS WITH RAILING: A LITTLE
WALKING IN HOSPITAL ROOM: A LITTLE
SUGGESTED CMS G CODE MODIFIER MOBILITY: CJ

## 2020-09-29 ASSESSMENT — FIBROSIS 4 INDEX: FIB4 SCORE: 0.78

## 2020-09-29 NOTE — PROGRESS NOTES
Trauma / Surgical Daily Progress Note    Date of Service  9/29/2020    Chief Complaint  65 y.o. female admitted 9/26/2020 as a trauma yellow transfer - fall down stairs - non op ICB    Interval Events  Transferred to [facility].  HTN improved with home meds  Dizziness is better with Antivert  Tolerating diet  Adequate pain control  Needs to mobilize  Cleared for discharge from trauma perspective  RX sent to [facility] pharmacy in [place]  CD of imaging ordered  Awaiting neurosurgery clearance  Anticipate home this afternoon without needs    Discussed plan with Nurse Magan    Review of Systems  Review of Systems   Constitutional: Positive for malaise/fatigue.   HENT: Negative.    Respiratory: Negative.    Genitourinary:        Voiding   Musculoskeletal: Positive for myalgias.   Neurological: Positive for headaches.   Psychiatric/Behavioral: Negative.    All other systems reviewed and are negative.       Vital Signs  Temp:  [36.1 °C (97 °F)-36.7 °C (98 °F)] 36.1 °C (97 °F)  Pulse:  [] 72  Resp:  [14-53] 18  BP: (134-177)/(62-96) 143/78  SpO2:  [93 %-98 %] 95 %    Physical Exam  Physical Exam  Vitals signs and nursing note reviewed.   Constitutional:       General: She is not in acute distress.     Appearance: Normal appearance. She is obese.      Interventions: Nasal cannula in place.   HENT:      Head: Laceration (stapled posterior) present.      Right Ear: External ear normal.      Left Ear: External ear normal.      Nose: Nose normal.      Mouth/Throat:      Mouth: Mucous membranes are moist.      Pharynx: Oropharynx is clear.   Eyes:      General:         Right eye: No discharge.         Left eye: No discharge.      Conjunctiva/sclera: Conjunctivae normal.      Pupils: Pupils are equal, round, and reactive to light.   Neck:      Musculoskeletal: Normal range of motion. No neck rigidity or muscular tenderness.   Cardiovascular:      Pulses: Normal pulses.   Pulmonary:      Effort: Pulmonary effort is normal. No  respiratory distress.      Breath sounds: Normal breath sounds.   Abdominal:      General: There is no distension.      Palpations: Abdomen is soft.      Tenderness: There is no abdominal tenderness.   Musculoskeletal:         General: No swelling, tenderness or signs of injury.   Skin:     General: Skin is warm and dry.      Capillary Refill: Capillary refill takes less than 2 seconds.   Neurological:      General: No focal deficit present.      Mental Status: She is alert and oriented to person, place, and time.      Gait: Gait normal.   Psychiatric:         Mood and Affect: Mood normal.         Behavior: Behavior normal.         Thought Content: Thought content normal.         Laboratory  Recent Results (from the past 24 hour(s))   CBC WITH DIFFERENTIAL    Collection Time: 09/29/20  5:16 AM   Result Value Ref Range    WBC 8.1 4.8 - 10.8 K/uL    RBC 4.40 4.20 - 5.40 M/uL    Hemoglobin 13.6 12.0 - 16.0 g/dL    Hematocrit 41.7 37.0 - 47.0 %    MCV 94.8 81.4 - 97.8 fL    MCH 30.9 27.0 - 33.0 pg    MCHC 32.6 (L) 33.6 - 35.0 g/dL    RDW 43.4 35.9 - 50.0 fL    Platelet Count 325 164 - 446 K/uL    MPV 9.3 9.0 - 12.9 fL    Neutrophils-Polys 81.20 (H) 44.00 - 72.00 %    Lymphocytes 11.80 (L) 22.00 - 41.00 %    Monocytes 4.70 0.00 - 13.40 %    Eosinophils 1.20 0.00 - 6.90 %    Basophils 0.60 0.00 - 1.80 %    Immature Granulocytes 0.50 0.00 - 0.90 %    Nucleated RBC 0.00 /100 WBC    Neutrophils (Absolute) 6.59 2.00 - 7.15 K/uL    Lymphs (Absolute) 0.96 (L) 1.00 - 4.80 K/uL    Monos (Absolute) 0.38 0.00 - 0.85 K/uL    Eos (Absolute) 0.10 0.00 - 0.51 K/uL    Baso (Absolute) 0.05 0.00 - 0.12 K/uL    Immature Granulocytes (abs) 0.04 0.00 - 0.11 K/uL    NRBC (Absolute) 0.00 K/uL   Basic Metabolic Panel    Collection Time: 09/29/20  5:16 AM   Result Value Ref Range    Sodium 135 135 - 145 mmol/L    Potassium 4.0 3.6 - 5.5 mmol/L    Chloride 98 96 - 112 mmol/L    Co2 23 20 - 33 mmol/L    Glucose 113 (H) 65 - 99 mg/dL    Bun 9 8 -  22 mg/dL    Creatinine 0.56 0.50 - 1.40 mg/dL    Calcium 9.2 8.5 - 10.5 mg/dL    Anion Gap 14.0 7.0 - 16.0   ESTIMATED GFR    Collection Time: 09/29/20  5:16 AM   Result Value Ref Range    GFR If African American >60 >60 mL/min/1.73 m 2    GFR If Non African American >60 >60 mL/min/1.73 m 2       Fluids    Intake/Output Summary (Last 24 hours) at 9/29/2020 0845  Last data filed at 9/29/2020 0600  Gross per 24 hour   Intake 720 ml   Output 1200 ml   Net -480 ml       Core Measures & Quality Metrics  Medications reviewed and Labs reviewed  Olivier catheter: No Olivier      DVT Prophylaxis: Heparin  DVT prophylaxis - mechanical: SCDs  Ulcer prophylaxis: Not indicated    Assessed for rehab: Patient was assess for and/or received rehabilitation services during this hospitalization    RAP Score Total: 8    ETOH Screening  CAGE Score: 0  Assessment complete date: 9/27/2020        Assessment/Plan  Subdural hematoma, post-traumatic (HCC)- (present on admission)  Assessment & Plan  Moderate subdural hematoma posterior overlying the left occipital and parietal lobes.  Subarachnoid blood within the brain sulcations at the anterior margins, and base of the frontal lobes.  Non-operative management.   Follow up head CT with new hemorrhage in left frontal region  Post traumatic pharmacologic seizure prophylaxis for 1 week.  Speech Language Pathology cognitive evaluation.  Ronald Garcia DO. Neurosurgeon. [facility].    Hypertension- (present on admission)  Assessment & Plan  Chronic condition treated with amlodipine, metoprolol.  9/28 Resumed maintenance medication.    Renal insufficiency- (present on admission)  Assessment & Plan  Abnormal renal indices on admission  Cr 1.4 / BUN 24 / GFR 38  Gentle hydration  Repeat labs improved    Aspirin long-term use- (present on admission)  Assessment & Plan  Daily aspirin 81 mg  TEG with 87.2 % AA inhibition   Transfused one unit of platelets.  Repeat with 0.0%  inhibition.    Contraindication to deep vein thrombosis (DVT) prophylaxis- (present on admission)  Assessment & Plan  Initial systemic anticoagulation contraindicated secondary to elevated bleeding risk.   9/27 Surveillance venous duplex negative.  9/28 Heparin initiated.    Closed fracture of skull (HCC)- (present on admission)  Assessment & Plan  Nondisplaced fracture involving the right posterior fossa of the skull.  Non-operative management.  Ronald Garcia DO. Neurosurgeon. [facility].    Scalp laceration, initial encounter- (present on admission)  Assessment & Plan  Repaired at [facility]  Staples out 10 days    Trauma- (present on admission)  Assessment & Plan  Fell backwards on stairs. (-) LOC.  Evaluated at [facility]   Trauma Yellow Transfer Activation.  Guera Smith MD. Trauma Surgery.     Discussed patient condition with RN, Patient and trauma surgery. Dr. Smith

## 2020-09-29 NOTE — DISCHARGE PLANNING
Received Choice form at 7659  Agency/Facility Name: [facility]  Referral sent per Choice form @ 0745

## 2020-09-29 NOTE — DISCHARGE INSTRUCTIONS
Discharge Instructions    - Call or seek medical attention for questions or concerns  - Follow up with Dr. Garcia in 2 weeks time - call to arrange a repeat CT scan. May follow up in [place] if can coordinate neurosurgery follow up with your primary care provider.  - Avoid all blood thinners including aspirin or NSAIDs (ibuprophen, Advil, Aleve, Motrin) for at least two weeks  - Follow up with primary care provider within one weeks time  - Resume regular diet  - May take over the counter acetaminophen as needed for pain  - Continue daily over the counter stool softener while on narcotics  - No operation of machinery or motorized vehicles while under the influence of narcotics  - No alcohol, marijuana or illicit drug use while under the influence of narcotics  - No swimming, hot tubs, baths or wound submersion until cleared by outpatient provider. May shower  - No contact sports, strenuous activities, or heavy lifting until cleared by outpatient provider  - If respiratory decompensation, change in condition or worsening condition, or signs or symptoms of infection occur seek medical attention  - Head staples out in about 8-10 days from discharge.    Discharged to home by car with relative. Discharged via wheelchair, hospital escort: Yes.  Special equipment needed: Walker    Be sure to schedule a follow-up appointment with your primary care doctor or any specialists as instructed.     Discharge Plan:   Diet Plan: Discussed  Activity Level: Discussed  Confirmed Follow up Appointment: Patient to Call and Schedule Appointment  Confirmed Symptoms Management: Discussed  Medication Reconciliation Updated: Yes  Influenza Vaccine Indication: Indicated: 65 years and older  Influenza Vaccine Given - only chart on this line when given: Influenza Vaccine Given (See MAR)    I understand that a diet low in cholesterol, fat, and sodium is recommended for good health. Unless I have been given specific instructions below for another  diet, I accept this instruction as my diet prescription.     Special Instructions: None    · Is patient discharged on Warfarin / Coumadin?   No     Depression / Suicide Risk    As you are discharged from this [facility] facility, it is important to learn how to keep safe from harming yourself.    Recognize the warning signs:  · Abrupt changes in personality, positive or negative- including increase in energy   · Giving away possessions  · Change in eating patterns- significant weight changes-  positive or negative  · Change in sleeping patterns- unable to sleep or sleeping all the time   · Unwillingness or inability to communicate  · Depression  · Unusual sadness, discouragement and loneliness  · Talk of wanting to die  · Neglect of personal appearance   · Rebelliousness- reckless behavior  · Withdrawal from people/activities they love  · Confusion- inability to concentrate     If you or a loved one observes any of these behaviors or has concerns about self-harm, here's what you can do:  · Talk about it- your feelings and reasons for harming yourself  · Remove any means that you might use to hurt yourself (examples: pills, rope, extension cords, firearm)  · Get professional help from the community (Mental Health, Substance Abuse, psychological counseling)  · Do not be alone:Call your Safe Contact- someone whom you trust who will be there for you.  · Call your local CRISIS HOTLINE 744-9925 or 991-940-2888  · Call your local Children's Mobile Crisis Response Team [place] (615) 767-7847 or www.ePrivateHire  · Call the toll free National Suicide Prevention Hotlines   · National Suicide Prevention Lifeline 015-987-EENJ (2882)  · National Hope Line Network 800-SUICIDE (794-7616)      Subdural Hematoma    A subdural hematoma is a collection of blood between the brain and its outer covering (dura). As the amount of blood increases, pressure builds on the brain.  There are two types of subdural  hematomas:  · Acute. This type develops shortly after a hard, direct hit to the head and causes blood to collect very quickly. This is a medical emergency. If it is not diagnosed and treated quickly, it can lead to severe brain injury or death.  · Chronic. This is when bleeding develops more slowly, over weeks or months. In some cases, this type does not cause symptoms.  What are the causes?  This condition is caused by bleeding (hemorrhage) from a broken (ruptured) blood vessel. In most cases, a blood vessel ruptures and bleeds because of a head injury, such as from a hard, direct hit. Head injuries can happen in car accidents, falls, assaults, or while playing sports.  In rare cases, a hemorrhage can happen without a known cause (spontaneously), especially if you take blood thinners (anticoagulants).  What increases the risk?  This condition is more likely to develop in:  · Older people.  · Infants.  · People who take blood thinners.  · People who have head injuries.  · People who abuse alcohol.  What are the signs or symptoms?  Symptoms of this condition can vary depending on the size of the hematoma. Symptoms can be mild, severe, or life-threatening. They include:  · Headaches.  · Nausea or vomiting.  · Changes in vision, such as double vision or loss of vision.  · Changes in speech or trouble understanding what people say.  · Loss of balance or trouble walking.  · Weakness, numbness, or tingling in the arms or legs, especially on one side of the body.  · Seizures.  · Change in personality.  · Increased sleepiness.  · Memory loss.  · Loss of consciousness.  · Coma.  Symptoms of acute subdural hematoma can develop over minutes or hours. Symptoms of chronic subdural hematoma may develop over weeks or months.  How is this diagnosed?  This condition is diagnosed based on the results of:  · A physical exam.  · Tests of strength, reflexes, coordination, senses, manner of walking (gait), and facial and eye movements  (neurological exam).  · Imaging tests, such as an MRI or a CT scan.  How is this treated?  Treatment for this condition depends on the type of hematoma and how severe it is.  Treatment for acute hematoma may include:  · Emergency surgery to drain blood or remove a blood clot.  · Medicines that help the body get rid of excess fluids (diuretics). These may help to reduce pressure in the brain.  · Assisted breathing (ventilation).  Treatment for chronic hematoma may include:  · Observation and bed rest at the hospital.  · Surgery.  If you take blood thinners, you may need to stop taking them for a short time. You may also be given anti-seizure (anticonvulsant) medicine.  Sometimes, no treatment is needed for chronic subdural hematoma.  Follow these instructions at home:  Activity  · Avoid situations where you could injure your head again, such as in competitive sports, downhill snow sports, and horseback riding. Do not do these activities until your health care provider approves.  ? Wear protective gear, such as a helmet, when participating in activities such as biking or contact sports.  · Avoid too much visual stimulation while recovering. This means limiting how much you read and limiting your screen time on a smart phone, tablet, computer, or TV.  · Rest as told by your health care provider. Rest helps the brain heal.  · Try to avoid activities that cause physical or mental stress. Return to work or school as told by your health care provider.  · Do not lift anything that is heavier than 5 lb (2.3 kg), or the limit you are told, until your health care provider says that it is safe.  · Do not drive, ride a bike, or use heavy machinery until your health care provider approves.  · Always wear your seat belt when you are in a motor vehicle.  Alcohol use  · Do not drink alcohol if your health care provider tells you not to drink.  · If you drink alcohol, limit how much you use to:  ? 0-1 drink a day for women.  ? 0-2  drinks a day for men.  General instructions  · Monitor your symptoms, and ask people around you to do the same. Recovery from brain injuries varies. Talk with your health care provider about what to expect.  · Take over-the-counter and prescription medicines only as told by your health care provider. Do not take blood thinners or NSAIDs unless your health care provider approves. These include aspirin, ibuprofen, naproxen, and warfarin.  · Keep your home environment safe to reduce the risk of falling.  · Keep all follow-up visits as told by your health care provider. This is important.  Where to find more information  · National [place] of Neurological Disorders and Stroke: www.ninds.nih.gov  · American Academy of Neurology (AAN): www.aan.com  · Brain Injury Association of [place]: www.biausa.org  Get help right away if you:  · Are taking blood thinners and you fall or you experience minor trauma to the head. If you take any blood thinners, even a very small injury can cause a subdural hematoma.  · Have a bleeding disorder and you fall or you experience minor trauma to the head.  · Develop any of the following symptoms after a head injury:  ? Clear fluid draining from your nose or ears.  ? Nausea or vomiting.  ? Changes in speech or trouble understanding what people say.  ? Seizures.  ? Drowsiness or a decrease in alertness.  ? Double vision.  ? Numbness or inability to move (paralysis) in any part of your body.  ? Difficulty walking or poor coordination.  ? Difficulty thinking.  ? Confusion or forgetfulness.  ? Personality changes.  ? Irrational or aggressive behavior.  These symptoms may represent a serious problem that is an emergency. Do not wait to see if the symptoms will go away. Get medical help right away. Call your local emergency services (911 in the [place] Do not drive yourself to the hospital.  Summary  · A subdural hematoma is a collection of blood between the brain and its outer covering  (dura).  · Treatment for this condition depends on what type of subdural hematoma you have and how severe it is.  · Symptoms can vary from mild to severe to life-threatening.  · Monitor your symptoms, and ask others around you to do the same.  This information is not intended to replace advice given to you by your health care provider. Make sure you discuss any questions you have with your health care provider.  Document Released: 11/04/2005 Document Revised: 11/18/2019 Document Reviewed: 11/18/2019  Elsevier Patient Education © 2020 [employer]    Subarachnoid Hemorrhage    Subarachnoid hemorrhage is bleeding between the brain and the layer that covers the brain. The bleeding puts pressure on the brain, and it stops blood from going to some areas of the brain. If this bleeding is not treated, it may cause brain damage, stroke, or death. This is an emergency. You must be treated in the hospital right away.  You are more likely to get this condition if you:  · Smoke.  · Have high blood pressure.  · Drink too much alcohol.  · Are older than age 50.  · Are female, especially if you have stopped getting your period for a year or longer (menopause).  · Have a family history of burst blood vessels (aneurysms).  · Have a certain syndrome that leads to one of these:  ? Kidney disease.  ? Disease of tissues like bones, blood, and fat (connective tissues).  Signs of this bleeding condition include:  · Sudden, very bad headache. It may feel like the worst headache you have ever had.  · Feeling sick to your stomach (nausea) or throwing up (vomiting), especially if you have other signs such as a headache.  · Sudden weakness or loss of feeling (numbness) in your face, arm, or leg, especially on one side of the body.  · Sudden trouble with any of these:  ? Walking.  ? Moving an arm or leg.  ? Talking.  ? Understanding what people say.  ? Swallowing.  ? Seeing out of one eye or both eyes.  · Sudden confusion.  · Seeing  "double.  · Loss of balance.  · Sensitivity to light.  · Stiff neck.  · Trouble staying awake.  · Passing out (fainting).  Follow these instructions at home:  Medicines  · Take over-the-counter and prescription medicines only as told by your doctor.  · Do not take any medicines that contain aspirin or NSAIDs (like ibuprofen) unless your doctor says that it is safe to take them.  Lifestyle  · Do not use any products that have nicotine or tobacco. These include cigarettes and e-cigarettes. If you need help quitting, ask your doctor.  · Limit alcohol to 1 drink a day for nonpregnant women and 2 drinks a day for men. One drink is equal to:  ? 12 oz of beer.  ? 5 oz of wine.  ? 1½ oz of hard liquor.  Eating and drinking  · Ask your doctor if it is safe for you to eat and drink. You may need tests to make sure that you can swallow safely (swallow studies).  Driving  · Do not drive until your doctor says that it is safe to drive.  · Do not drive or use heavy machinery while taking prescription pain medicine.  General instructions  · Do therapy as recommended. This may include:  ? Physical therapy (PT).  ? Occupational therapy (OT).  ? Speech-language therapy.  · Rest and limit activity as told by your doctor. Rest helps your brain to heal. Make sure you:  ? Get plenty of sleep.  ? Avoid activities that cause stress to your body or mind.  · Check your blood pressure as told by your doctor. Write down your blood pressure.  · Keep all follow-up visits as told by your doctors. This is important.  Contact a doctor if:  · You have a stiff neck.  · You have a cough.  · You have a fever.  Get help right away if:  · You have any signs of a stroke. \"BE FAST\" is an easy way to remember the main warning signs:  ? B - Balance. Signs are dizziness, sudden trouble walking, or loss of balance.  ? E - Eyes. Signs are trouble seeing or a sudden change in how you see.  ? F - Face. Signs are sudden weakness or loss of feeling of the face, or " the face or eyelid drooping on one side.  ? A - Arms. Signs are weakness or loss of feeling in an arm. This happens suddenly and usually on one side of the body.  ? S - Speech. Signs are sudden trouble speaking, slurred speech, or trouble understanding what people say.  ? T - Time. Time to call emergency services. Write down what time symptoms started.  · You have other signs of a stroke, such as:  ? A sudden, very bad headache with no known cause.  ? Feeling sick to your stomach.  ? Throwing up.  ? Jerky movements you cannot control (seizure).  These symptoms may be an emergency. Do not wait to see if the symptoms will go away. Get medical help right away. Call your local emergency services (911 in the [place] Do not drive yourself to the hospital.  Summary  · Subarachnoid hemorrhage is bleeding in the brain. It is an emergency. You must be treated in the hospital right away.  · Follow instructions from your doctor about eating, resting, and taking medicines.  · Do not take any medicines that contain aspirin or NSAIDs (like ibuprofen) unless your doctor says that it is safe to take them.  This information is not intended to replace advice given to you by your health care provider. Make sure you discuss any questions you have with your health care provider.  Document Released: 04/14/2014 Document Revised: 11/30/2018 Document Reviewed: 09/27/2018  Elsevier Patient Education © 2020 Elsevier Inc.

## 2020-09-29 NOTE — CARE PLAN
Problem: Knowledge Deficit  Goal: Knowledge of disease process/condition, treatment plan, diagnostic tests, and medications will improve  Note: Pt updated on POC     Problem: Pain Management  Goal: Pain level will decrease to patient's comfort goal  Note: Assess pts pain level and treat as needed.

## 2020-09-29 NOTE — PROGRESS NOTES
2 RN skin check completed with Adriana CHANEY    Posterior head laceration with staples.     No other skin issues noted.

## 2020-09-29 NOTE — PROGRESS NOTES
Spiritual Care Note    Patient Information     Patient's Name: Ling Ford   MRN: [id number]    YOB: 1955   Age and Gender: 65 y.o. female   Service Area: ***   Room (and Bed): [place] [address]   Ethnicity or Nationality: {RACE/ETHNICITY :8101}    Primary Language: {LIST; Languages:09287}   Tenriism/Spiritual preference: Jainism of [employer]/Mu-ism   Place of Residence: [place]   Family/Friends/Others Present: ***   Clinical Team Present: ***   Medical Diagnosis(-es)/Procedure(s): ***   Code Status: Full Code    Date of Admission: 9/26/2020   Length of Stay: 3 days        Spiritual Care Provider Information:  Name of Spiritual Care Provider: ***  Title of Spiritual Care Provider: {Spiritual Care Providers:60415}  Phone Number: [phone]  E-mail: ***    minutes    Spiritual Screen Results:    Gen Nursing  Spiritual Screen  Is your spiritual health or inner well-being important to you as you cope with your medical condition?: Yes  Would you like to receive a visit from our Spiritual Care team or your own Adventist or spiritual leader?: Yes  Was spiritual care education provided to the patient?: Declined  Sources of Hope/[place]: Tenriism/Spiritual community     Palliative Care  PC Tenriism/Spiritual Screening  Was spiritual care education provided to the patient?: Declined      Encounter/Request Information  Encounter/Request Type   Visited With: Patient  Nature of the Visit: Initial, On shift  General Visit: Yes  Referral From/ Origin of Request: Epic nursing  Referral To: Community clergy([facility])    Religous Needs/Values       Spiritual Assessment   Spiritual Care Encounters  Interacton/Conversation: Patient requested a visit from [facility] elders  Interventions: Pascual Mcclure of the [facility] [employer] who will set up the visit.    Notes:

## 2020-09-29 NOTE — THERAPY
Physical Therapy   Initial Evaluation     Patient Name: Ling Ford  Age:  65 y.o., Sex:  female  Medical Record #: [id number]  Today's Date: 9/29/2020     Precautions: (P) Fall Risk    Assessment  Patient is 65 y.o. female admitted following GLF on stairs with subsequent SDH/SAH. Pt presents to physical therapy with slight impairments in gait and balance. Pt deomonstrated improved ambulation and balance w/ FWW vs w/o. Discussed with pt and recommeded use of FWW upon DC home and follow up with Outpatient PT. PT to remain available for 2 more visits as needed prior to DC; however, Pt is functionally capable of DC home at this time with FWW and outpatient PT follow up should balance and gait deviations persist.   Plan    Recommend Physical Therapy x2 more visits as needed prior to DC for the following treatments:  Gait Training and Self Care/Home Evaluation    DC Equipment Recommendations: Front-Wheel Walker  Discharge Recommendations: Recommend outpatient physical therapy services to address higher level deficits          09/29/20 1024   Precautions   Precautions Fall Risk   Pain 0 - 10 Group   Therapist Pain Assessment During Activity;Nurse Notified  (no pain reported)   Prior Living Situation   Prior Services None;Home-Independent   Housing / Facility 2 Story House   Steps Into Home 2   Steps In Home   (FOS to basement (laundry))   Rail Left Rail (Steps in Home)   Equipment Owned None   Lives with - Patient's Self Care Capacity Adult Children   Comments Pt is from [place], lives with Dtr and 4 grandkids. Plans to stay in [place] upon DC with brother in [facility] with 1 step to enter. He will be able to assist as needed upon DC and will drive her back to [place] when she feels ready   Prior Level of Functional Mobility   Bed Mobility Independent   Transfer Status Independent   Ambulation Independent   Distance Ambulation (Feet)   (community)   Assistive Devices Used None   Stairs Independent   Cognition    Level of  Consciousness Alert   Comments pleasant and agreeable to work with PT   Active ROM Lower Body    Active ROM Lower Body  WDL   Strength Lower Body   Lower Body Strength  WDL   Comments has baseline R LE weakness   Balance Assessment   Sitting Balance (Static) Good   Sitting Balance (Dynamic) Good   Standing Balance (Static) Fair   Standing Balance (Dynamic) Fair -   Weight Shift Sitting Good   Weight Shift Standing Fair   Comments w/ AD 2 LOB with PT assist to correct. Improved with FWW with 1 LOB which pt able to self correct   Gait Analysis   Gait Level Of Assist Minimal Assist  (intermittent with LOB -> SPV)   Assistive Device Front Wheel Walker;None   Distance (Feet) 200  (with FWW and 75 ft w/o AD)   Deviation   (increased lateral sway, variable step length)   # of Stairs Climbed 2  (x2 trials with railing support)   Level of Assist with Stairs Supervised   Weight Bearing Status no restrictions   Bed Mobility    Supine to Sit Supervised   Sit to Supine Supervised   Scooting Supervised   Functional Mobility   Sit to Stand Supervised   Bed, Chair, Wheelchair Transfer Supervised   Patient / Family Goals    Patient / Family Goal #1 to return home   Short Term Goals    Short Term Goal # 1 Pt will ambulate > 200 ft without AD and SPV in 2 visits   Problem List    Problems Impaired Balance   Anticipated Discharge Equipment and Recommendations   DC Equipment Recommendations Front-Wheel Walker   Discharge Recommendations Recommend outpatient physical therapy services to address higher level deficits

## 2020-09-29 NOTE — CARE PLAN
Problem: Communication  Goal: The ability to communicate needs accurately and effectively will improve  Outcome: PROGRESSING AS EXPECTED  Note: Encouraged use of call light, assessed needs, encouraged pt to voice feelings.        Problem: Safety  Goal: Will remain free from falls  Outcome: PROGRESSING AS EXPECTED  Note: Patient at risk for falls, bed alarm on, walker out of sight, nonskid socks on, call light within reach, personal belongings within reach, toileting offered.

## 2020-09-29 NOTE — PROGRESS NOTES
Assumed patient care at 0700 and received bedside report from RN. Patient alert and oriented times 4. Patient denies numbness and tingling. Patient denies chest pain, shortness of breath, blurry/double vision. Patient ambulating SBA. Patient continenet of bowel and bladder. Patient is tolerating diet. Plan of care discussed, education provided on all administered medications, hourly rounding and Q4 neuro checks in place.

## 2020-09-29 NOTE — DISCHARGE PLANNING
Anticipated Discharge Disposition:   Home to brother's house in [place]     Action:    Pt admitted with subdural hematoma after fall down stairs.    RN CM spoke to patient.  She stated she lives in [facility].  She plans to stay with her brother, Jun in [place] for a few days and then he will drive her to [place]. [phone]. Discussed FWW and patient agreeable.  Choice form faxed to [facility].  FWW ordered through [employer].  Pts PCP is Kadeem Peck.  [phone].    PT recommending outpatient PT and FWW.  OT no needs.    Barriers to Discharge:    None    Plan:    DC planned today.    Care Transition Team Assessment    Information Source  Orientation : Oriented x 4  Information Given By: Patient  Who is responsible for making decisions for patient? : Patient    Readmission Evaluation  Is this a readmission?: No    Elopement Risk  Legal Hold: No  Ambulatory or Self Mobile in Wheelchair: Yes  Disoriented: No  Psychiatric Symptoms: None  History of Wandering: No  Elopement this Admit: No  Vocalizing Wanting to Leave: No  Displays Behaviors, Body Language Wanting to Leave: No-Not at Risk for Elopement  Elopement Risk: Not at Risk for Elopement    Interdisciplinary Discharge Planning  Lives with - Patient's Self Care Capacity: Adult Children  Patient or legal guardian wants to designate a caregiver: No  Housing / Facility: [address] [facility]  Prior Services: None, Home-Independent    Discharge Preparedness  What is your plan after discharge?: Home with help  What are your discharge supports?: Child, Sibling  Prior Functional Level: Ambulatory, Drives Self, Independent with Activities of Daily Living, Independent with Medication Management  Difficulity with ADLs: Walking  Difficulity with IADLs: Driving, Shopping    Functional Assesment  Prior Functional Level: Ambulatory, Drives Self, Independent with Activities of Daily Living, Independent with Medication Management    Finances  Financial Barriers to Discharge:  No  Prescription Coverage: Yes    Vision / Hearing Impairment  Vision Impairment : Yes  Right Eye Vision: Wears Glasses  Left Eye Vision: Wears Glasses  Hearing Impairment : No         Advance Directive  Advance Directive?: None    Domestic Abuse  Have you ever been the victim of abuse or violence?: No  Physical Abuse or Sexual Abuse: No  Verbal Abuse or Emotional Abuse: No  Possible Abuse/Neglect Reported to:: Not Applicable         Discharge Risks or Barriers  Discharge risks or barriers?: No    Anticipated Discharge Information  Discharge Disposition: Discharged to home/self care (01)  Discharge Address: [facility]  Discharge Contact Phone Number: [phone]

## 2020-09-30 NOTE — PROGRESS NOTES
Regular size walker has been sized set up and delivered to pt's room.. Bariatric walker was ordered for pt. But pt did not want or even need a ezra walker.

## 2020-09-30 NOTE — PROGRESS NOTES
Patient discharged home with no needs, DME walker provided at discharge. Discharge information and education provided, all questions answered. Patient will call to follow up with PCP in [place] for her repeat CT. PIV removed. Flue shot given, educational materials provided. All belongings returned. Patient escorted out via wheelchair.

## 2020-09-30 NOTE — DISCHARGE SUMMARY
ADMIT DATE:  09/26/2020    DISCHARGE DATE:  09/29/2020    LENGTH OF STAY:  3 days.    ATTENDING PHYSICIAN:  Guera Smith MD    CONSULTING PHYSICIAN:  Ronald Garcia DO    DISCHARGE DIAGNOSES:  1.  Subdural hematoma.  2.  Aspirin long-term use.  3.  Closed fracture of skull.  4.  Hypertension, premorbid.  5.  Renal insufficiency, resolved.  6.  Scalp laceration.    PROCEDURES:  None.    HISTORY OF PRESENT ILLNESS:  This is a pleasant 65-year-old female [place] she slipped and tripped   down 3 stairs, striking her head.  She was initially seen at [place] where she   was found to have a laceration and a CT which demonstrated a subarachnoid   hemorrhage.  She was transported to [facility] as a trauma transfer in accordance   with the pre-established hospital guidelines.    HOSPITAL COURSE:  On arrival, she underwent extensive radiographic and   laboratory studies and was admitted to the critical care team under the   direction and supervision of Dr. Guera Smith.  She sustained the above   injuries and incurred the above diagnoses during her stay.    She was admitted to the intensive care unit where she remained for   approximately 2 days.  She was then transferred out to the neuroscience unit   where she has been for the rest of her stay.  As of today, she is tolerating a   regular diet.  She is up ambulating with a front wheeled walker.  She has   participated in therapies.  Her headache is resolving with adequate pain   control and she is stable for discharge back to her brother in the [place]   area.  He will eventually drive her back to the [place] area where her home is.    As previously mentioned, she suffered a subdural hematoma.  Admit imaging   noted a moderate subdural hematoma posterior overlying the left occipital and   parietal lobes.  There was some subarachnoid blood within the brain sulcations   at the anterior margins and base of the frontal  lobes.  Her followup imaging   noted a new hemorrhage in the left frontal region.  Dr. Garcia and team were   consulted.  Felt it was nonoperative in management.  She will complete   posttraumatic pharmacologic seizure prophylaxis with Keppra for which a   prescription was provided.    She also suffered a nondisplaced fracture involving the right posterior fossa   of the skull.  Again, Dr. Garcia was consulted.  This was nonoperative in   management.    She also suffered a scalp laceration.  This was repaired at [facility]   with staples.  Her staples need to be out in about 8-10 more days.    She has a long-term history of using aspirin.  Her TEG on admission had an   87.2% AA inhibition.  We did transfuse 1 unit of platelets.  Her repeat   inhibition was 0.0%.  She will withhold all aspirins and anti-inflammatories   until followup with a neurosurgeon.    She did have a premorbid history of hypertension.  It is treated with 3   different medications.  Her medications were resumed.  She has adequate blood   pressure control.    Her admit renal indices were a little abnormal.  Her creatinine was 1.4, BUN   24 and GFR 38.  She underwent gentle hydration and her repeat labs were   improved.    DISCHARGE PHYSICAL EXAM:  Please see Epic exam dated day of discharge.    DISCHARGE MEDICATIONS:  1.  Keppra 500 mg, may take 1 tab by mouth twice a day for the next 5 days.  2.  Fioricet 325-40-50, may take 1 tab by mouth every 6 hours as needed for   headache, #20, no refills.    She may resume all home medications with the exception of her aspirin.    I have reviewed the narcotic report as well as the opioid risk assessment tool   regarding this patient.    DISPOSITION:  The patient will be discharged home in stable condition.  She   will follow up with her primary care provider in [place].  She will additionally   need to follow up with a neurosurgeon either here or in [place].  We do recommend   this in the next 1-2  weeks.  We do recommend that if she cannot do it when   she returns to [place], to follow up here with Dr. Garcia prior to driving back   to [place].  She has been extensively counseled on when to seek emergency   treatment such as changing condition, worsening condition, fever, signs and   symptoms of infection, or any other changes in condition.  She does verbalize   understanding with regard to this.       ____________________________________     MANJIT ZAHNG DO / NTS    DD:  09/29/2020 15:42:40  DT:  09/30/2020 03:20:03    D#:  [id number]  Job#:  [id number]    cc: Ronald Garcia DO

## 2020-10-01 NOTE — DOCUMENTATION QUERY
[facility]                                                                       Query Response Note      PATIENT:               TYLER ESPINOSA  ACCT #:                  [id number]  MRN:                     [id number]  :                      1955  ADMIT DATE:       2020 10:02 PM  DISCH DATE:        2020 6:32 PM  RESPONDING  PROVIDER #:        [id number]           QUERY TEXT:    There is conflicting documentation in the medical record related to loss of consciousness in the Medical Record.    1) Pt did have LOC is documented in the neurosurgery consult.  2) Did not have any loss of consciousness is documented in the H&P and (-) LOC is documented in the Progress Notes    Based on treatment, clinical findings and risk factors, can this documentation be further clarified?    The patient's Clinical Indicators include:  Per H&P: She has no memory of the fall.   She apparently did not have any loss of consciousness  Per Progress Notes: (-) LOC   Per Neurosurgery consult: Did have LOC  Risk Factors: trauma, Fell backwards on stairs  Options provided:   -- Traumatic SDH with LOC ruled in   -- Traumatic SDH without LOC ruled in   -- Unable to determine      Query created by: Julia Reyes on 2020 8:10 AM    RESPONSE TEXT:    Unable to determine          Electronically signed by:  NINA MARVIN MD 10/1/2020 3:04 PM